# Patient Record
Sex: FEMALE | URBAN - METROPOLITAN AREA
[De-identification: names, ages, dates, MRNs, and addresses within clinical notes are randomized per-mention and may not be internally consistent; named-entity substitution may affect disease eponyms.]

---

## 2018-02-21 ENCOUNTER — INPATIENT (INPATIENT)
Facility: HOSPITAL | Age: 83
LOS: 7 days | Discharge: HOME | DRG: 326 | End: 2018-03-01
Attending: HOSPITALIST | Admitting: HOSPITALIST
Payer: MEDICARE

## 2018-02-21 VITALS
DIASTOLIC BLOOD PRESSURE: 73 MMHG | RESPIRATION RATE: 18 BRPM | TEMPERATURE: 98 F | HEART RATE: 78 BPM | WEIGHT: 160.06 LBS | OXYGEN SATURATION: 98 % | HEIGHT: 68 IN | SYSTOLIC BLOOD PRESSURE: 113 MMHG

## 2018-02-21 LAB
ALBUMIN SERPL ELPH-MCNC: 3.8 G/DL — SIGNIFICANT CHANGE UP (ref 3.3–5.2)
ALP SERPL-CCNC: 124 U/L — HIGH (ref 40–120)
ALT FLD-CCNC: 28 U/L — SIGNIFICANT CHANGE UP
ANION GAP SERPL CALC-SCNC: 13 MMOL/L — SIGNIFICANT CHANGE UP (ref 5–17)
APTT BLD: 30.6 SEC — SIGNIFICANT CHANGE UP (ref 27.5–37.4)
AST SERPL-CCNC: 22 U/L — SIGNIFICANT CHANGE UP
BASOPHILS # BLD AUTO: 0 K/UL — SIGNIFICANT CHANGE UP (ref 0–0.2)
BASOPHILS NFR BLD AUTO: 0.2 % — SIGNIFICANT CHANGE UP (ref 0–2)
BILIRUB SERPL-MCNC: 0.4 MG/DL — SIGNIFICANT CHANGE UP (ref 0.4–2)
BUN SERPL-MCNC: 31 MG/DL — HIGH (ref 8–20)
CALCIUM SERPL-MCNC: 10.8 MG/DL — HIGH (ref 8.6–10.2)
CHLORIDE SERPL-SCNC: 103 MMOL/L — SIGNIFICANT CHANGE UP (ref 98–107)
CO2 SERPL-SCNC: 25 MMOL/L — SIGNIFICANT CHANGE UP (ref 22–29)
CREAT SERPL-MCNC: 0.65 MG/DL — SIGNIFICANT CHANGE UP (ref 0.5–1.3)
EOSINOPHIL # BLD AUTO: 0 K/UL — SIGNIFICANT CHANGE UP (ref 0–0.5)
EOSINOPHIL NFR BLD AUTO: 0 % — SIGNIFICANT CHANGE UP (ref 0–6)
GLUCOSE SERPL-MCNC: 96 MG/DL — SIGNIFICANT CHANGE UP (ref 70–115)
HCT VFR BLD CALC: 35.9 % — LOW (ref 37–47)
HGB BLD-MCNC: 11.6 G/DL — LOW (ref 12–16)
INR BLD: 1.4 RATIO — HIGH (ref 0.88–1.16)
LIDOCAIN IGE QN: 24 U/L — SIGNIFICANT CHANGE UP (ref 22–51)
LYMPHOCYTES # BLD AUTO: 1.5 K/UL — SIGNIFICANT CHANGE UP (ref 1–4.8)
LYMPHOCYTES # BLD AUTO: 15.7 % — LOW (ref 20–55)
MAGNESIUM SERPL-MCNC: 2.4 MG/DL — SIGNIFICANT CHANGE UP (ref 1.6–2.6)
MCHC RBC-ENTMCNC: 29.3 PG — SIGNIFICANT CHANGE UP (ref 27–31)
MCHC RBC-ENTMCNC: 32.3 G/DL — SIGNIFICANT CHANGE UP (ref 32–36)
MCV RBC AUTO: 90.7 FL — SIGNIFICANT CHANGE UP (ref 81–99)
MONOCYTES # BLD AUTO: 0.5 K/UL — SIGNIFICANT CHANGE UP (ref 0–0.8)
MONOCYTES NFR BLD AUTO: 5.2 % — SIGNIFICANT CHANGE UP (ref 3–10)
NEUTROPHILS # BLD AUTO: 7.4 K/UL — SIGNIFICANT CHANGE UP (ref 1.8–8)
NEUTROPHILS NFR BLD AUTO: 78.7 % — HIGH (ref 37–73)
PLATELET # BLD AUTO: 318 K/UL — SIGNIFICANT CHANGE UP (ref 150–400)
POTASSIUM SERPL-MCNC: 4.6 MMOL/L — SIGNIFICANT CHANGE UP (ref 3.5–5.3)
POTASSIUM SERPL-SCNC: 4.6 MMOL/L — SIGNIFICANT CHANGE UP (ref 3.5–5.3)
PROT SERPL-MCNC: 7.3 G/DL — SIGNIFICANT CHANGE UP (ref 6.6–8.7)
PROTHROM AB SERPL-ACNC: 15.5 SEC — HIGH (ref 9.8–12.7)
RBC # BLD: 3.96 M/UL — LOW (ref 4.4–5.2)
RBC # FLD: 16.3 % — HIGH (ref 11–15.6)
SODIUM SERPL-SCNC: 141 MMOL/L — SIGNIFICANT CHANGE UP (ref 135–145)
TROPONIN T SERPL-MCNC: 0.01 NG/ML — SIGNIFICANT CHANGE UP (ref 0–0.06)
TSH SERPL-MCNC: 5.9 UIU/ML — HIGH (ref 0.27–4.2)
WBC # BLD: 9.4 K/UL — SIGNIFICANT CHANGE UP (ref 4.8–10.8)
WBC # FLD AUTO: 9.4 K/UL — SIGNIFICANT CHANGE UP (ref 4.8–10.8)

## 2018-02-21 PROCEDURE — 99291 CRITICAL CARE FIRST HOUR: CPT | Mod: 25

## 2018-02-21 PROCEDURE — 31500 INSERT EMERGENCY AIRWAY: CPT

## 2018-02-21 RX ORDER — SODIUM CHLORIDE 9 MG/ML
1000 INJECTION INTRAMUSCULAR; INTRAVENOUS; SUBCUTANEOUS
Qty: 0 | Refills: 0 | Status: DISCONTINUED | OUTPATIENT
Start: 2018-02-21 | End: 2018-02-22

## 2018-02-21 RX ORDER — MORPHINE SULFATE 50 MG/1
2 CAPSULE, EXTENDED RELEASE ORAL ONCE
Qty: 0 | Refills: 0 | Status: DISCONTINUED | OUTPATIENT
Start: 2018-02-21 | End: 2018-02-21

## 2018-02-21 RX ADMIN — SODIUM CHLORIDE 150 MILLILITER(S): 9 INJECTION INTRAMUSCULAR; INTRAVENOUS; SUBCUTANEOUS at 23:17

## 2018-02-21 NOTE — ED ADULT TRIAGE NOTE - CHIEF COMPLAINT QUOTE
pt alert and awake, normal baseline, sent by PMD for clogged g-tube, was at [facility] yesterday for same complaint.

## 2018-02-21 NOTE — ED PROVIDER NOTE - NS ED MD EM SELECTION
[id number] Comprehensive [id number] Critical Care - 30 to 74 minutes [id number] Comprehensive [id number] Critical Care - 30 to 74 minutes

## 2018-02-21 NOTE — ED PROVIDER NOTE - CRITICAL CARE PROVIDED
direct patient care (not related to procedure)/consultation with other physicians/interpretation of diagnostic studies/additional history taking/consult w/ pt's family directly relating to pts condition/documentation

## 2018-02-21 NOTE — ED PROVIDER NOTE - PHYSICAL EXAMINATION
Constitutional : Pt appears dazed, makes eye contact.   Head :NC AT , no swelling  Eyes :eomi, no swelling  Mouth : Dry mucous membranes.   Neck : supple, trachea in midline  Chest :Alex air entry, symm chest expansion, no distress  Heart :S1 S2 distant  Abdomen : Pt grimaces with palpation of LUQ and RUQ.   Musc/Skel : 3- / 5 strength to bilateral lower extremities. 4/5 strength to bilateral upper extremities  Neuro  : Pt oriented to person. Motor and sensory grossly intact. Constitutional : Pt appears dazed, makes eye contact.   Head :NC AT , no swelling  Eyes :eomi, no swelling  Mouth : Dry mucous membranes.   Neck : supple, trachea in midline  Chest :Alex air entry, symm chest expansion, no distress  Heart :S1 S2 distant  Abdomen : Pt grimaces with palpation of LUQ and RUQ. mid upper abd peg tube with granulating tissue, no surrounding swelling, no surrounding erythema,  Musc/Skel : 3- / 5 strength to bilateral lower extremities. 4/5 strength to bilateral upper extremities  Neuro  : Pt oriented to person. Motor and sensory grossly intact.

## 2018-02-21 NOTE — ED PROVIDER NOTE - MEDICAL DECISION MAKING DETAILS
An 88 y/o female pt with a hx of UTI's, aspiration, presents to the ED c/o G-tube clog. Since G-tube is 2 weeks old will CT a/p and re-evaluate.

## 2018-02-21 NOTE — ED ADULT NURSE NOTE - OBJECTIVE STATEMENT
Pt. presents to ED for PEG tube eval. Pt. is bedbound, had PEG tube placed two weeks prior due to frequent aspirations. Family is concerned PEG tube clogged.

## 2018-02-21 NOTE — ED PROVIDER NOTE - OBJECTIVE STATEMENT
An 87 year old female pt with recurrent aspirations, UTI's, bedbound and had PEG placed 2 weeks ago at [facility] by surgeon presents to the ED c/o PEG tube issue. Pt was seen at [facility] yesterday and as per daughter  the tube was flushed. Pt was sent home and since yesterday has not been able to receive feedings. She has not had any  fevers, vomiting or diarrhea. No further hx available at this time.

## 2018-02-21 NOTE — ED PROVIDER NOTE - PROGRESS NOTE DETAILS
Spoke with radiologist in regards to CT. Rim of PEG is at surface of the abd with no balloon noted. Spoke with radiologist in regards to CT. Rim of PEG is at surface of the abd with no balloon noted.  reexamined peg it does not have a balloon entrance separate, most likelt tube is curved on ct daughter left before ct was read, she expressed that she does not want mother to be transferred back to the [facility], she also states she does not want to take mother home

## 2018-02-22 DIAGNOSIS — K92.0 HEMATEMESIS: ICD-10-CM

## 2018-02-22 DIAGNOSIS — T85.598A OTHER MECHANICAL COMPLICATION OF OTHER GASTROINTESTINAL PROSTHETIC DEVICES, IMPLANTS AND GRAFTS, INITIAL ENCOUNTER: ICD-10-CM

## 2018-02-22 DIAGNOSIS — Z93.1 GASTROSTOMY STATUS: Chronic | ICD-10-CM

## 2018-02-22 DIAGNOSIS — Z98.890 OTHER SPECIFIED POSTPROCEDURAL STATES: Chronic | ICD-10-CM

## 2018-02-22 LAB
ALBUMIN SERPL ELPH-MCNC: 3.4 G/DL — SIGNIFICANT CHANGE UP (ref 3.3–5.2)
ALP SERPL-CCNC: 106 U/L — SIGNIFICANT CHANGE UP (ref 40–120)
ALT FLD-CCNC: 19 U/L — SIGNIFICANT CHANGE UP
ANION GAP SERPL CALC-SCNC: 14 MMOL/L — SIGNIFICANT CHANGE UP (ref 5–17)
ANION GAP SERPL CALC-SCNC: 14 MMOL/L — SIGNIFICANT CHANGE UP (ref 5–17)
ANISOCYTOSIS BLD QL: SLIGHT — SIGNIFICANT CHANGE UP
ANISOCYTOSIS BLD QL: SLIGHT — SIGNIFICANT CHANGE UP
AST SERPL-CCNC: 15 U/L — SIGNIFICANT CHANGE UP
BASOPHILS # BLD AUTO: 0 K/UL — SIGNIFICANT CHANGE UP (ref 0–0.2)
BASOPHILS NFR BLD AUTO: 0 % — SIGNIFICANT CHANGE UP (ref 0–2)
BASOPHILS NFR BLD AUTO: 0.1 % — SIGNIFICANT CHANGE UP (ref 0–2)
BASOPHILS NFR BLD AUTO: 0.2 % — SIGNIFICANT CHANGE UP (ref 0–2)
BILIRUB SERPL-MCNC: 0.3 MG/DL — LOW (ref 0.4–2)
BLD GP AB SCN SERPL QL: SIGNIFICANT CHANGE UP
BUN SERPL-MCNC: 30 MG/DL — HIGH (ref 8–20)
BUN SERPL-MCNC: 35 MG/DL — HIGH (ref 8–20)
CALCIUM SERPL-MCNC: 10.2 MG/DL — SIGNIFICANT CHANGE UP (ref 8.6–10.2)
CALCIUM SERPL-MCNC: 9.4 MG/DL — SIGNIFICANT CHANGE UP (ref 8.6–10.2)
CHLORIDE SERPL-SCNC: 101 MMOL/L — SIGNIFICANT CHANGE UP (ref 98–107)
CHLORIDE SERPL-SCNC: 105 MMOL/L — SIGNIFICANT CHANGE UP (ref 98–107)
CO2 SERPL-SCNC: 21 MMOL/L — LOW (ref 22–29)
CO2 SERPL-SCNC: 24 MMOL/L — SIGNIFICANT CHANGE UP (ref 22–29)
CREAT SERPL-MCNC: 0.54 MG/DL — SIGNIFICANT CHANGE UP (ref 0.5–1.3)
CREAT SERPL-MCNC: 0.82 MG/DL — SIGNIFICANT CHANGE UP (ref 0.5–1.3)
EOSINOPHIL # BLD AUTO: 0 K/UL — SIGNIFICANT CHANGE UP (ref 0–0.5)
EOSINOPHIL NFR BLD AUTO: 0 % — SIGNIFICANT CHANGE UP (ref 0–6)
GAS PNL BLDA: SIGNIFICANT CHANGE UP
GAS PNL BLDA: SIGNIFICANT CHANGE UP
GLUCOSE SERPL-MCNC: 116 MG/DL — HIGH (ref 70–115)
GLUCOSE SERPL-MCNC: 291 MG/DL — HIGH (ref 70–115)
HCT VFR BLD CALC: 28.8 % — LOW (ref 37–47)
HCT VFR BLD CALC: 30.5 % — LOW (ref 37–47)
HCT VFR BLD CALC: 35.4 % — LOW (ref 37–47)
HGB BLD-MCNC: 11.4 G/DL — LOW (ref 12–16)
HGB BLD-MCNC: 8.9 G/DL — LOW (ref 12–16)
HGB BLD-MCNC: 9.5 G/DL — LOW (ref 12–16)
HYPOCHROMIA BLD QL: SLIGHT — SIGNIFICANT CHANGE UP
LACTATE BLDV-MCNC: 6.6 MMOL/L — CRITICAL HIGH (ref 0.5–2)
LYMPHOCYTES # BLD AUTO: 1.1 K/UL — SIGNIFICANT CHANGE UP (ref 1–4.8)
LYMPHOCYTES # BLD AUTO: 10 % — LOW (ref 20–55)
LYMPHOCYTES # BLD AUTO: 14.5 % — LOW (ref 20–55)
LYMPHOCYTES # BLD AUTO: 14.6 % — LOW (ref 20–55)
LYMPHOCYTES # BLD AUTO: 3.4 K/UL — SIGNIFICANT CHANGE UP (ref 1–4.8)
LYMPHOCYTES # BLD AUTO: 4 K/UL — SIGNIFICANT CHANGE UP (ref 1–4.8)
MACROCYTES BLD QL: SLIGHT — SIGNIFICANT CHANGE UP
MACROCYTES BLD QL: SLIGHT — SIGNIFICANT CHANGE UP
MAGNESIUM SERPL-MCNC: 2.3 MG/DL — SIGNIFICANT CHANGE UP (ref 1.6–2.6)
MCHC RBC-ENTMCNC: 28.6 PG — SIGNIFICANT CHANGE UP (ref 27–31)
MCHC RBC-ENTMCNC: 28.9 PG — SIGNIFICANT CHANGE UP (ref 27–31)
MCHC RBC-ENTMCNC: 29 PG — SIGNIFICANT CHANGE UP (ref 27–31)
MCHC RBC-ENTMCNC: 30.9 G/DL — LOW (ref 32–36)
MCHC RBC-ENTMCNC: 31.1 G/DL — LOW (ref 32–36)
MCHC RBC-ENTMCNC: 32.2 G/DL — SIGNIFICANT CHANGE UP (ref 32–36)
MCV RBC AUTO: 89.8 FL — SIGNIFICANT CHANGE UP (ref 81–99)
MCV RBC AUTO: 91.9 FL — SIGNIFICANT CHANGE UP (ref 81–99)
MCV RBC AUTO: 93.8 FL — SIGNIFICANT CHANGE UP (ref 81–99)
MICROCYTES BLD QL: SLIGHT — SIGNIFICANT CHANGE UP
MICROCYTES BLD QL: SLIGHT — SIGNIFICANT CHANGE UP
MONOCYTES # BLD AUTO: 0.6 K/UL — SIGNIFICANT CHANGE UP (ref 0–0.8)
MONOCYTES # BLD AUTO: 1.5 K/UL — HIGH (ref 0–0.8)
MONOCYTES # BLD AUTO: 1.8 K/UL — HIGH (ref 0–0.8)
MONOCYTES NFR BLD AUTO: 4 % — SIGNIFICANT CHANGE UP (ref 3–10)
MONOCYTES NFR BLD AUTO: 7.3 % — SIGNIFICANT CHANGE UP (ref 3–10)
MONOCYTES NFR BLD AUTO: 7.7 % — SIGNIFICANT CHANGE UP (ref 3–10)
NEUTROPHILS # BLD AUTO: 18.1 K/UL — HIGH (ref 1.8–8)
NEUTROPHILS # BLD AUTO: 19.4 K/UL — HIGH (ref 1.8–8)
NEUTROPHILS # BLD AUTO: 6.1 K/UL — SIGNIFICANT CHANGE UP (ref 1.8–8)
NEUTROPHILS NFR BLD AUTO: 77 % — HIGH (ref 37–73)
NEUTROPHILS NFR BLD AUTO: 78 % — HIGH (ref 37–73)
NEUTROPHILS NFR BLD AUTO: 84 % — HIGH (ref 37–73)
NEUTS BAND # BLD: 1 % — SIGNIFICANT CHANGE UP (ref 0–8)
OVALOCYTES BLD QL SMEAR: SLIGHT — SIGNIFICANT CHANGE UP
OVALOCYTES BLD QL SMEAR: SLIGHT — SIGNIFICANT CHANGE UP
PLAT MORPH BLD: NORMAL — SIGNIFICANT CHANGE UP
PLAT MORPH BLD: NORMAL — SIGNIFICANT CHANGE UP
PLATELET # BLD AUTO: 270 K/UL — SIGNIFICANT CHANGE UP (ref 150–400)
PLATELET # BLD AUTO: 415 K/UL — HIGH (ref 150–400)
PLATELET # BLD AUTO: 422 K/UL — HIGH (ref 150–400)
PLATELET CLUMP BLD QL SMEAR: SIGNIFICANT CHANGE UP
PLATELET COUNT - ESTIMATE: NORMAL — SIGNIFICANT CHANGE UP
POIKILOCYTOSIS BLD QL AUTO: SLIGHT — SIGNIFICANT CHANGE UP
POIKILOCYTOSIS BLD QL AUTO: SLIGHT — SIGNIFICANT CHANGE UP
POTASSIUM SERPL-MCNC: 4.3 MMOL/L — SIGNIFICANT CHANGE UP (ref 3.5–5.3)
POTASSIUM SERPL-MCNC: 7.4 MMOL/L — CRITICAL HIGH (ref 3.5–5.3)
POTASSIUM SERPL-SCNC: 4.3 MMOL/L — SIGNIFICANT CHANGE UP (ref 3.5–5.3)
POTASSIUM SERPL-SCNC: 7.4 MMOL/L — CRITICAL HIGH (ref 3.5–5.3)
PROT SERPL-MCNC: 6.1 G/DL — LOW (ref 6.6–8.7)
RBC # BLD: 3.07 M/UL — LOW (ref 4.4–5.2)
RBC # BLD: 3.32 M/UL — LOW (ref 4.4–5.2)
RBC # BLD: 3.94 M/UL — LOW (ref 4.4–5.2)
RBC # FLD: 16.2 % — HIGH (ref 11–15.6)
RBC # FLD: 16.4 % — HIGH (ref 11–15.6)
RBC # FLD: 16.6 % — HIGH (ref 11–15.6)
RBC BLD AUTO: ABNORMAL
RBC BLD AUTO: ABNORMAL
SODIUM SERPL-SCNC: 136 MMOL/L — SIGNIFICANT CHANGE UP (ref 135–145)
SODIUM SERPL-SCNC: 143 MMOL/L — SIGNIFICANT CHANGE UP (ref 135–145)
TROPONIN T SERPL-MCNC: <0.01 NG/ML — SIGNIFICANT CHANGE UP (ref 0–0.06)
TYPE + AB SCN PNL BLD: SIGNIFICANT CHANGE UP
VARIANT LYMPHS # BLD: 1 % — SIGNIFICANT CHANGE UP (ref 0–6)
WBC # BLD: 23.6 K/UL — HIGH (ref 4.8–10.8)
WBC # BLD: 25.2 K/UL — HIGH (ref 4.8–10.8)
WBC # BLD: 7.8 K/UL — SIGNIFICANT CHANGE UP (ref 4.8–10.8)
WBC # FLD AUTO: 23.6 K/UL — HIGH (ref 4.8–10.8)
WBC # FLD AUTO: 25.2 K/UL — HIGH (ref 4.8–10.8)
WBC # FLD AUTO: 7.8 K/UL — SIGNIFICANT CHANGE UP (ref 4.8–10.8)

## 2018-02-22 PROCEDURE — 99223 1ST HOSP IP/OBS HIGH 75: CPT | Mod: 25

## 2018-02-22 PROCEDURE — 71045 X-RAY EXAM CHEST 1 VIEW: CPT | Mod: 26,77

## 2018-02-22 PROCEDURE — 43255 EGD CONTROL BLEEDING ANY: CPT

## 2018-02-22 PROCEDURE — 71045 X-RAY EXAM CHEST 1 VIEW: CPT | Mod: 26

## 2018-02-22 PROCEDURE — 49440 PLACE GASTROSTOMY TUBE PERC: CPT

## 2018-02-22 PROCEDURE — 93010 ELECTROCARDIOGRAM REPORT: CPT

## 2018-02-22 PROCEDURE — 12345: CPT | Mod: GC,NC

## 2018-02-22 RX ORDER — PANTOPRAZOLE SODIUM 20 MG/1
8 TABLET, DELAYED RELEASE ORAL
Qty: 80 | Refills: 0 | Status: DISCONTINUED | OUTPATIENT
Start: 2018-02-22 | End: 2018-02-24

## 2018-02-22 RX ORDER — CHLORHEXIDINE GLUCONATE 213 G/1000ML
15 SOLUTION TOPICAL
Qty: 0 | Refills: 0 | Status: DISCONTINUED | OUTPATIENT
Start: 2018-02-22 | End: 2018-02-24

## 2018-02-22 RX ORDER — METOPROLOL TARTRATE 50 MG
5 TABLET ORAL EVERY 8 HOURS
Qty: 0 | Refills: 0 | Status: DISCONTINUED | OUTPATIENT
Start: 2018-02-22 | End: 2018-02-22

## 2018-02-22 RX ORDER — DEXMEDETOMIDINE HYDROCHLORIDE IN 0.9% SODIUM CHLORIDE 4 UG/ML
0.2 INJECTION INTRAVENOUS
Qty: 200 | Refills: 0 | Status: DISCONTINUED | OUTPATIENT
Start: 2018-02-22 | End: 2018-02-23

## 2018-02-22 RX ORDER — LEVOTHYROXINE SODIUM 125 MCG
56 TABLET ORAL AT BEDTIME
Qty: 0 | Refills: 0 | Status: DISCONTINUED | OUTPATIENT
Start: 2018-02-22 | End: 2018-02-24

## 2018-02-22 RX ORDER — SODIUM BICARBONATE 1 MEQ/ML
50 SYRINGE (ML) INTRAVENOUS ONCE
Qty: 0 | Refills: 0 | Status: COMPLETED | OUTPATIENT
Start: 2018-02-22 | End: 2018-02-22

## 2018-02-22 RX ORDER — METOCLOPRAMIDE HCL 10 MG
10 TABLET ORAL ONCE
Qty: 0 | Refills: 0 | Status: COMPLETED | OUTPATIENT
Start: 2018-02-22 | End: 2018-02-22

## 2018-02-22 RX ORDER — INSULIN HUMAN 100 [IU]/ML
10 INJECTION, SOLUTION SUBCUTANEOUS ONCE
Qty: 0 | Refills: 0 | Status: COMPLETED | OUTPATIENT
Start: 2018-02-22 | End: 2018-02-22

## 2018-02-22 RX ORDER — FENTANYL CITRATE 50 UG/ML
75 INJECTION INTRAVENOUS
Qty: 0 | Refills: 0 | Status: DISCONTINUED | OUTPATIENT
Start: 2018-02-22 | End: 2018-02-22

## 2018-02-22 RX ORDER — PANTOPRAZOLE SODIUM 20 MG/1
80 TABLET, DELAYED RELEASE ORAL ONCE
Qty: 0 | Refills: 0 | Status: COMPLETED | OUTPATIENT
Start: 2018-02-22 | End: 2018-02-22

## 2018-02-22 RX ORDER — CALCIUM GLUCONATE 100 MG/ML
2 VIAL (ML) INTRAVENOUS ONCE
Qty: 0 | Refills: 0 | Status: COMPLETED | OUTPATIENT
Start: 2018-02-22 | End: 2018-02-22

## 2018-02-22 RX ORDER — DEXTROSE 50 % IN WATER 50 %
50 SYRINGE (ML) INTRAVENOUS ONCE
Qty: 0 | Refills: 0 | Status: COMPLETED | OUTPATIENT
Start: 2018-02-22 | End: 2018-02-22

## 2018-02-22 RX ORDER — ACETAMINOPHEN 500 MG
650 TABLET ORAL EVERY 6 HOURS
Qty: 0 | Refills: 0 | Status: DISCONTINUED | OUTPATIENT
Start: 2018-02-22 | End: 2018-02-22

## 2018-02-22 RX ORDER — FENTANYL CITRATE 50 UG/ML
75 INJECTION INTRAVENOUS
Qty: 0 | Refills: 0 | Status: DISCONTINUED | OUTPATIENT
Start: 2018-02-22 | End: 2018-02-23

## 2018-02-22 RX ORDER — PANTOPRAZOLE SODIUM 20 MG/1
40 TABLET, DELAYED RELEASE ORAL
Qty: 0 | Refills: 0 | Status: DISCONTINUED | OUTPATIENT
Start: 2018-02-22 | End: 2018-02-22

## 2018-02-22 RX ORDER — NOREPINEPHRINE BITARTRATE/D5W 8 MG/250ML
1 PLASTIC BAG, INJECTION (ML) INTRAVENOUS
Qty: 8 | Refills: 0 | Status: DISCONTINUED | OUTPATIENT
Start: 2018-02-22 | End: 2018-02-23

## 2018-02-22 RX ORDER — PROPOFOL 10 MG/ML
10 INJECTION, EMULSION INTRAVENOUS
Qty: 1000 | Refills: 0 | Status: DISCONTINUED | OUTPATIENT
Start: 2018-02-22 | End: 2018-02-23

## 2018-02-22 RX ORDER — PROTHROMBIN COMPLEX CONCENTRATE (HUMAN) 25.5; 16.5; 24; 22; 22; 26 [IU]/ML; [IU]/ML; [IU]/ML; [IU]/ML; [IU]/ML; [IU]/ML
2000 POWDER, FOR SOLUTION INTRAVENOUS ONCE
Qty: 0 | Refills: 0 | Status: COMPLETED | OUTPATIENT
Start: 2018-02-22 | End: 2018-02-22

## 2018-02-22 RX ORDER — PROTHROMBIN COMPLEX CONCENTRATE (HUMAN) 25.5; 16.5; 24; 22; 22; 26 [IU]/ML; [IU]/ML; [IU]/ML; [IU]/ML; [IU]/ML; [IU]/ML
3000 POWDER, FOR SOLUTION INTRAVENOUS ONCE
Qty: 0 | Refills: 0 | Status: DISCONTINUED | OUTPATIENT
Start: 2018-02-22 | End: 2018-02-22

## 2018-02-22 RX ORDER — FENTANYL CITRATE 50 UG/ML
100 INJECTION INTRAVENOUS ONCE
Qty: 0 | Refills: 0 | Status: DISCONTINUED | OUTPATIENT
Start: 2018-02-22 | End: 2018-02-22

## 2018-02-22 RX ADMIN — PROPOFOL 4.36 MICROGRAM(S)/KG/MIN: 10 INJECTION, EMULSION INTRAVENOUS at 23:29

## 2018-02-22 RX ADMIN — Medication 56 MICROGRAM(S): at 23:29

## 2018-02-22 RX ADMIN — Medication 136.12 MICROGRAM(S)/KG/MIN: at 21:56

## 2018-02-22 RX ADMIN — PROPOFOL 4.36 MICROGRAM(S)/KG/MIN: 10 INJECTION, EMULSION INTRAVENOUS at 21:00

## 2018-02-22 RX ADMIN — Medication 50 MILLILITER(S): at 20:24

## 2018-02-22 RX ADMIN — INSULIN HUMAN 10 UNIT(S): 100 INJECTION, SOLUTION SUBCUTANEOUS at 20:26

## 2018-02-22 RX ADMIN — PANTOPRAZOLE SODIUM 80 MILLIGRAM(S): 20 TABLET, DELAYED RELEASE ORAL at 20:23

## 2018-02-22 RX ADMIN — Medication 10 MILLIGRAM(S): at 21:25

## 2018-02-22 RX ADMIN — Medication 400 GRAM(S): at 20:37

## 2018-02-22 RX ADMIN — Medication 5 MILLIGRAM(S): at 06:30

## 2018-02-22 RX ADMIN — FENTANYL CITRATE 100 MICROGRAM(S): 50 INJECTION INTRAVENOUS at 20:23

## 2018-02-22 RX ADMIN — PANTOPRAZOLE SODIUM 10 MG/HR: 20 TABLET, DELAYED RELEASE ORAL at 21:33

## 2018-02-22 RX ADMIN — SODIUM CHLORIDE 125 MILLILITER(S): 9 INJECTION INTRAMUSCULAR; INTRAVENOUS; SUBCUTANEOUS at 18:25

## 2018-02-22 RX ADMIN — Medication 50 MILLIEQUIVALENT(S): at 20:25

## 2018-02-22 RX ADMIN — PROTHROMBIN COMPLEX CONCENTRATE (HUMAN) 400 INTERNATIONAL UNIT(S): 25.5; 16.5; 24; 22; 22; 26 POWDER, FOR SOLUTION INTRAVENOUS at 21:02

## 2018-02-22 RX ADMIN — Medication 5 MILLIGRAM(S): at 15:19

## 2018-02-22 RX ADMIN — DEXMEDETOMIDINE HYDROCHLORIDE IN 0.9% SODIUM CHLORIDE 3.63 MICROGRAM(S)/KG/HR: 4 INJECTION INTRAVENOUS at 19:59

## 2018-02-22 NOTE — CHART NOTE - NSCHARTNOTEFT_GEN_A_CORE
Rapid Response PGY 2/ PGY 3 Note    [id number]  MARQUIS JACOBS    Rapid Response was called on a 87y year old Female patient for  acute respiratory distress and brisk bleeding from the NG.  Patient was intubated in the ED on my arrival and being stabilized.    Patient recently received a PEG tube at [facility] 2nd to dysphagia and aspiration, which became dislodged and clogged.  There was a replacement of the gastrostomy tube under moderate sedation today by IR Dr. Clayton.    Patient was seen and examined at the bedside by the rapid response team.    Allergies    No Known Allergies    Intolerances    vitals /107  RR43   O2 sat 88      PAST MEDICAL & SURGICAL HISTORY:  Compression fracture of thoracic vertebra: T12  Hypothyroidism  HLD (hyperlipidemia)  HTN (hypertension)  Dementia  History of hip surgery: B/L  S/P percutaneous endoscopic gastrostomy (PEG) tube placement      Vital Signs Last 24 Hrs  T(C): 36.8 (22 Feb 2018 17:38), Max: 36.9 (21 Feb 2018 23:13)  T(F): 98.3 (22 Feb 2018 17:38), Max: 98.5 (21 Feb 2018 23:13)  HR: 114 (22 Feb 2018 20:00) (74 - 123)  BP: 75/47 (22 Feb 2018 20:00) (75/47 - 143/67)  BP(mean): --  RR: 30 (22 Feb 2018 19:50) (17 - 30)  SpO2: 100% (22 Feb 2018 20:00) (94% - 100%)          PHYSICAL EXAM:    GENERAL: intubated  HEAD:  Atraumatic, Normocephalic  ENMT: intubated, NG tube placed with 400 cc of bright red blood drained  NECK: Supple, No JVD, Normal thyroid  NERVOUS SYSTEM:  sedated  CHEST/LUNG: Clear to percussion bilaterally;  HEART: Tachycardia; No murmurs, rubs, or gallops  ABDOMEN: Soft, Nontender, Nondistended  EXTREMITIES:  2+ Peripheral Pulses, No clubbing, cyanosis, or edema  SKIN: No rashes or lesions      02-21 @ 07:01  -  02-22 @ 07:00  --------------------------------------------------------  IN: 125 mL / OUT: 0 mL / NET: 125 mL    02-22 @ 07:01  -  02-22 @ 20:17  --------------------------------------------------------  IN: 1125 mL / OUT: 0 mL / NET: 1125 mL                              9.5    25.2  )-----------( 422      ( 22 Feb 2018 19:22 )             30.5     02-22    136  |  101  |  35.0<H>  ----------------------------<  291<H>  7.4<HH>   |  21.0<L>  |  0.82    Ca    9.4      22 Feb 2018 19:22  Mg     2.3     02-22    TPro  6.1<L>  /  Alb  3.4  /  TBili  0.3<L>  /  DBili  x   /  AST  15  /  ALT  19  /  AlkPhos  106  02-22         LIVER FUNCTIONS - ( 22 Feb 2018 19:22 )  Alb: 3.4 g/dL / Pro: 6.1 g/dL / ALK PHOS: 106 U/L / ALT: 19 U/L / AST: 15 U/L / GGT: x              PT/INR - ( 21 Feb 2018 21:17 )   PT: 15.5 sec;   INR: 1.40 ratio         PTT - ( 21 Feb 2018 21:17 )  PTT:30.6 sec    Vital Signs Last 24 Hrs*       Assessment- Rapid Response called for 87y year old Female with acute respiratory distress and brisk bleeding from the NG.    Plan-CBC, CMP, Type and Screen  2 units of blood stat, consent for blood transfusion obtained from daughter  protonix 80 mg IV given stat    CTA of abdomen and pelvis with contrast.    Hyperkalemia of 7.4   calcium gluconate 2 grams given stat    IR and Nocturnist Dr. Warner on call contacted, MICU, Acute Care Surgery, and Dr. Hays at the bedside.        [facility] PGY 3

## 2018-02-22 NOTE — ED PROCEDURE NOTE - PROCEDURE ADDITIONAL DETAILS
Patient was intubated with DL with 7.5 cm ETT, vocal cords visualized with Grade I view. ETT was passed on first attempt with positive color change no colorimeter, positive condensation on tube with breaths, bilateral breath sounds with absence of sounds over the stomach and oxygenation improved to 99%. Patient tolerated procedure well.

## 2018-02-22 NOTE — PROCEDURE NOTE - NSINFORMCONSENT_GEN_A_CORE
Benefits, risks, and possible complications of procedure explained to patient/caregiver who verbalized understanding and gave verbal consent./Daughter at bedside
Benefits, risks, and possible complications of procedure explained to patient/caregiver who verbalized understanding and gave verbal consent.

## 2018-02-22 NOTE — H&P ADULT - NSHPPHYSICALEXAM_GEN_ALL_CORE
Vital Signs   T(C): 36.9 (21 Feb 2018 23:13), Max: 36.9 (21 Feb 2018 23:13)  T(F): 98.5 (21 Feb 2018 23:13), Max: 98.5 (21 Feb 2018 23:13)  HR: 85 (21 Feb 2018 23:13) (78 - 85)  BP: 115/73 (21 Feb 2018 23:13) (113/73 - 115/73)  RR: 18 (21 Feb 2018 23:13) (18 - 18)  SpO2: 95% (21 Feb 2018 23:13) (95% - 98%)  General: Well developed. Well nourished. No acute distress  HEENT: PERRLA. EOMI. Clear conjunctivae. Dry mucus membrane  Neck: Supple. No JVD. No Thyromegaly   Chest: CTA bilaterally - no wheezing, rales or rhonchi.   Heart: Normal S1 & S2 with RRR.   Abdomen: Soft. Non-tender. Non-distended. + BS  Ext: No pedal edema. No calf tenderness. PEG in place with surrounding area of healing tissue.  Neuro: Active and alert. No focal deficit. Speech clear. Moves all four limbs.  Skin: Warm and Dry  Psychiatry: Normal mood and affect

## 2018-02-22 NOTE — PROGRESS NOTE ADULT - SUBJECTIVE AND OBJECTIVE BOX
CC: PEG tube is clogged (22 Feb 2018 00:47)    HPI:History was taken from charts and ED Physician as no family member is available at this time. Called patient's daughter - Monse Newsome but couldn't get in touch. Patient has dementia and is unable to provide any information.   87 years old female brought by EMS with clogged PEG Tube. Patient had PEG placed 2 weeks ago at [facility] due to dysphagia and recurrent aspiration. It was not working since yesterday and patient was taken to [facility] where it was flushed and later patient was discharged home. But it got clogged again so patient was brought to [facility]. Family refused to take her back to [facility].  No fever, abdominal pain, nausea or vomiting. (22 Feb 2018 00:47)    INTERVAL HPI/OVERNIGHT EVENTS: no complaints, confused, ROS UTO due to dementia    Vital Signs Last 24 Hrs  T(C): 36.4 (22 Feb 2018 12:02), Max: 36.9 (21 Feb 2018 23:13)  T(F): 97.6 (22 Feb 2018 12:02), Max: 98.5 (21 Feb 2018 23:13)  HR: 74 (22 Feb 2018 12:02) (74 - 85)  BP: 143/67 (22 Feb 2018 12:02) (113/73 - 143/67)  RR: 21 (22 Feb 2018 12:02) (17 - 21)  SpO2: 94% (22 Feb 2018 12:02) (94% - 98%)  I&O's Detail    21 Feb 2018 07:01  -  22 Feb 2018 07:00  --------------------------------------------------------  IN:    sodium chloride 0.9%.: 125 mL  Total IN: 125 mL    OUT:  Total OUT: 0 mL    Total NET: 125 mL      22 Feb 2018 07:01  -  22 Feb 2018 12:19  --------------------------------------------------------  IN:    sodium chloride 0.9%.: 625 mL  Total IN: 625 mL    OUT:  Total OUT: 0 mL    Total NET: 625 mL    CARDIAC MARKERS ( 21 Feb 2018 21:17 )  x     / 0.01 ng/mL / x     / x     / x                            11.4   7.8   )-----------( 270      ( 22 Feb 2018 06:14 )             35.4     22 Feb 2018 06:14    143    |  105    |  30.0   ----------------------------<  116    4.3     |  24.0   |  0.54     Ca    10.2       22 Feb 2018 06:14  Mg     2.3       22 Feb 2018 06:14    TPro  7.3    /  Alb  3.8    /  TBili  0.4    /  DBili  x      /  AST  22     /  ALT  28     /  AlkPhos  124    21 Feb 2018 21:17    PT/INR - ( 21 Feb 2018 21:17 )   PT: 15.5 sec;   INR: 1.40 ratio    PTT - ( 21 Feb 2018 21:17 )  PTT:30.6 sec  LIVER FUNCTIONS - ( 21 Feb 2018 21:17 )  Alb: 3.8 g/dL / Pro: 7.3 g/dL / ALK PHOS: 124 U/L / ALT: 28 U/L / AST: 22 U/L / GGT: x           MEDICATIONS  (STANDING):  levothyroxine Injectable 56 MICROGram(s) IV Push at bedtime  metoprolol    tartrate Injectable 5 milliGRAM(s) IV Push every 8 hours  sodium chloride 0.9%. 1000 milliLiter(s) (125 mL/Hr) IV Continuous <Continuous>    MEDICATIONS  (PRN):  acetaminophen  Suppository. 650 milliGRAM(s) Rectal every 6 hours PRN Headache or Bodyache    RADIOLOGY & ADDITIONAL TESTS: personally visualized    PHYSICAL EXAM:    General: elderly female in no acute distress  Eyes: PERRLA, EOMI; conjunctiva and sclera clear  Head: Normocephalic; atraumatic  ENMT: No nasal discharge; airway clear  Neck: Supple; non tender; no masses  Respiratory: No wheezes, rales or rhonchi  Cardiovascular: Regular rate and rhythm. S1 and S2  Gastrointestinal: Soft non-tender non-distended; Normal bowel sounds, PEG tube in subcutaneous tissue  Genitourinary: No costovertebral angle tenderness  Extremities: Normal range of motion, No clubbing, cyanosis or edema  Vascular: Peripheral pulses palpable 2+ bilaterally  Neurological: Alert, confused  Skin: Warm and dry.  Musculoskeletal: Normal tone, without deformities  Psychiatric: Cooperative

## 2018-02-22 NOTE — BRIEF OPERATIVE NOTE - OPERATION/FINDINGS
Fluoroscopy guided 20 fr gastrostomy tube placement.
Active arterial bleeding at gastrostomy tube insertion site in proximal body of stomach beneath the gastrostomy balloon, 5 cc. of epinephrine injected to slow the bleeding and then two endoscopic hemoclips were successfully deployed at arterial bleeding site with cessation of bleeding. Esophagus and remainder of stomach were normal. Clot in antrum with no bleeding which was small and could not be dislodged, Duodenum entered and bulb and second portion of duodenum were normal. No active bleeding post procedure.

## 2018-02-22 NOTE — ED ADULT NURSE REASSESSMENT NOTE - NS ED NURSE REASSESS COMMENT FT1
3rd unit of PRBC infusing as per icu liz liang.
alea Garcia, no NG tube to be replaced at this time
patient moved to critical care room, Dr. Garcia and team at bedside for upper endo of patient, patient placed on cardiac monitor, propafol stopped as per team, protonix drip started. RNs at bedside will continue to monitor patient
patients bloodpressure 60/30, levophed increased to 1mcg/kg as per ICU PA azul at 2149, repeat blood pressure at 2150 as charted, levo decreased to 0.5mcg/kg. at 2152, levophed decreased to 0.1mcg/kg as per GI team
1 unit of blood infusing via rapid transfuser
As per ICU PA, precedex increased to 1mcg/kg,
Dr. Garcia at bedside for evaluation
ICU PA at bedside for patient evaluation
ICU PA at bedside inserting central line
Levophed decreased to 0.1mcg/kg as per ICU PA
Precedex d/c'd while propofol infusing as per ICU CLARIBEL Yang
Surgery resident at patients bedside

## 2018-02-22 NOTE — CHART NOTE - NSCHARTNOTEFT_GEN_A_CORE
Pt was seen and examined at bedside along with surgery attending. pt at the time intubated; hypotensive SBP to 70's-80's; tachycardic to 110's; NGT in place with approximately 300 cc bloody output; PEG site with mild amount of blood. abdominal exam otherwise with mild diffuse ttp, soft; non-distended.    Recs  - massive transfusion protocol  - Kcentra  - GI for EGD  - surgery will follow closely  - full consult note to follow    Pt was seen and examined along with Dr. Ibrahim

## 2018-02-22 NOTE — H&P ADULT - NSHPLABSRESULTS_GEN_ALL_CORE
LABS:                        11.6   9.4   )-----------( 318      ( 21 Feb 2018 21:17 )             35.9     02-21    141  |  103  |  31.0<H>  ----------------------------<  96  4.6   |  25.0  |  0.65    Ca    10.8<H>      21 Feb 2018 21:17  Mg     2.4     02-21    TPro  7.3  /  Alb  3.8  /  TBili  0.4  /  DBili  x   /  AST  22  /  ALT  28  /  AlkPhos  124<H>  02-21    PT/INR - ( 21 Feb 2018 21:17 )   PT: 15.5 sec;   INR: 1.40 ratio         PTT - ( 21 Feb 2018 21:17 )  PTT:30.6 sec  CARDIAC MARKERS ( 21 Feb 2018 21:17 )  x     / 0.01 ng/mL / x     / x     / x        CT Abdomen and Pelvis w/ Oral Cont and w/ IV Cont (02.21.18 @ 22:23)  Malpositioned PEG tube with the balloon within the anterior subcutaneous tissues. Despite this, oral contrast is seen throughout the GI system   without extravasation likely via a tract into the stomach.  Age-indeterminate T12 compression fracture

## 2018-02-22 NOTE — BRIEF OPERATIVE NOTE - PRE-OP DX
Feeding tube dysfunction  02/22/2018    Active  Alfreda Guillory
Feeding tube dysfunction  02/22/2018    Active  Alfreda Guillory  Hematemesis, presence of nausea not specified  02/22/2018    Active  Mack Garcia

## 2018-02-22 NOTE — H&P ADULT - ASSESSMENT
History was taken from charts and ED Physician as no family member is available at this time. Called patient's daughter - Monse Newsome but couldn't get in touch. Patient has dementia and is unable to provide any information.   87 years old female brought by EMS with clogged PEG Tube. Patient had PEG placed 2 weeks ago at [facility] due to dysphagia and recurrent aspiration. It was not working since yesterday and patient was taken to [facility] where it was flushed and later patient was discharged home. But it got clogged again so patient was brought to [facility]. Family refused to take her to [facility].  No fever, abdominal pain, nausea or vomiting.     1) Malpositioned PEG Tube  - IR consult in am to reposition the tube  - NPO  2) Dehydration  - IVF  3) HTN  - Will verify meds from family in am  - Metoprolol 5 mg IVP q 8 hours  4) HLD  - Will verify meds from family in am  5) Hypothyroidism  - Levothyroxine 56 mcg IVP (as per meds in system Levothyroxine 112 mcg)  6) Dementia  - Will verify meds from family in am  DVT Prophylaxis -- As per meds in system, patient is taking Eliquis 2.5 mg at home. Will verify from family and will restart. History was taken from charts and ED Physician as no family member is available at this time. Called patient's daughter - Monse Newsome but couldn't get in touch. Patient has dementia and is unable to provide any information.   87 years old female brought by EMS with clogged PEG Tube. Patient had PEG placed 2 weeks ago at [facility] due to dysphagia and recurrent aspiration. It was not working since yesterday and patient was taken to [facility] where it was flushed and later patient was discharged home. But it got clogged again so patient was brought to [facility]. Family refused to take her back to [facility].  No fever, abdominal pain, nausea or vomiting.     1) Malpositioned PEG Tube  - IR consult in am to reposition the tube  - NPO  2) Dehydration  - IVF  3) HTN  - Will verify meds from family in am  - Metoprolol 5 mg IVP q 8 hours  4) HLD  - Will verify meds from family in am  5) Hypothyroidism  - Levothyroxine 56 mcg IVP (as per meds in system Levothyroxine 112 mcg)  6) Dementia  - Will verify meds from family in am  DVT Prophylaxis -- As per meds in system, patient is taking Eliquis 2.5 mg at home. Will verify from family and will restart. History was taken from charts and ED Physician as no family member is available at this time. Called patient's daughter - Monse Newsome but couldn't get in touch. Patient has dementia and is unable to provide any information.   87 years old female brought by EMS with clogged PEG Tube. Patient had PEG placed 2 weeks ago at [facility] due to dysphagia and recurrent aspiration. It was not working since yesterday and patient was taken to [facility] where it was flushed and later patient was discharged home. But it got clogged again so patient was brought to [facility]. Family refused to take her back to [facility].  No fever, abdominal pain, nausea or vomiting.     1) Malpositioned PEG Tube  - IR consult in am to reposition the tube  - NPO  2) Dehydration  - IVF  3) HTN  - Will verify meds from family in am  - Metoprolol 5 mg IVP q 8 hours  4) HLD  - Will verify meds from family in am  5) Hypothyroidism  - Levothyroxine 56 mcg IVP (as per meds in system Levothyroxine 112 mcg)  6) Dementia  - Will verify meds from family in am  DVT Prophylaxis -- As per meds in system, patient is taking Eliquis 2.5 mg (? a. fib) at home. Will verify from family and will restart.

## 2018-02-22 NOTE — BRIEF OPERATIVE NOTE - PROCEDURE
<<-----Click on this checkbox to enter Procedure EGD w control of hemorrhage w image guidance  02/22/2018  EGD with epinephrine injection and endoscopic hemoclip placement (two clips) at site of arterial bleeding at gastrostomy bumper insertion site.  Active  [id number]

## 2018-02-22 NOTE — CHART NOTE - NSCHARTNOTEFT_GEN_A_CORE
-called to bedside by resident team on this 87F with dementia, aphasia, chronic aspiration, PEG tube.    -Pt. admitted to [facility] for repalcement of clogged PEG tube. Patient taken for IR procedure, PEG tube replaced, new site used as old site was not -re-accessible (per procedure note)  -post op, patient developed gurgling respirations, likely aspiration and was intubated by ER staff.    -On ym arrival to ER found patient with NGT in palce, with profuse bright red blood coming from tube. Patient also hypotenswive and tachycardic. patient also noted to vomit blood/blood clots    -resident team reached out to IR (Dr. courtney) who willr eview case and reccomended CTA.    -however patient became hemodynamically unstable. Urgent right femoral MAC dual lumen introducer placed to intiaite massive transfusion protocl.    -trauma surgeon/resident at bedside as well.    -PAtient given STAT 2 untis PRBC, 2 units FFp, platelets, and K-centra (is on eliquis at home)    -Dr. rojas arrived from GI service, and will perform urgent EGD to eval PEG site, trauma team to be present for EGD to eval if any surgical interventions needed.     -Patient also staretd on levophed for hypotension. titrating for MAP 65-70    Hb was 11.5 on admission, and most recent Hb S/P 2 units PRBC was 8.9,    -protonic 80 Mh ivp given, will start infusion  -thomson pacled by RN team    -GI and anesthesia to perform EGD at bedside in ER    -if EGD un-revealing, and remains HD stable will need CTA abd/pelvis and IR re-consult    -daughter at bedside and updated on plan, treatments, and potential surgical need, no questions at this time      PLAN:  -urgernt bedsdie EGD with Gi/trauma at bedside  -continue levophed for MAP 65-70  -further PRBC's as needed to maintain Hb >8  -repeat labs post EGD  -if EGD un-revealing, will need CTA abd pelvis and IR consult.  -labs showed K+ of 7.4, was given dextrose/insulin/bicarb/calciunm, repeat showed K+ 3.8  -Patient currently on Full vent support  -titrate settings to maintain SaO2 >90%, or pH >7.25  -consider low tidal volume ventilation strategy w/ goal Tv 4-6 cc/kg of ideal body weight  -plateu pressure goal <30  -Peridex oral care  -aggressive pulmonary toilet  -daily sedation vacation with spontaneous breathing trial if clinical condition warrants, discuss with respiratory therapy       45 minutes of critical care time spent at patient's bedside astabalizing blood pressure,titrating pressors, giving blood pruducts, ordering labs, reviewing labs, and discussing care with bedside team, consult services, and family

## 2018-02-22 NOTE — BRIEF OPERATIVE NOTE - COMMENTS
Keep NPO. Leave NGT out so as to not dislodge hemoclips. If pt. rebleeds will need possible surgical intervention. ACS present during EGD> IV Pantoprazole continuous infusion. Family informed. Prognosis remains gded.

## 2018-02-22 NOTE — PROCEDURE NOTE - NSPROCDETAILS_GEN_ALL_CORE
sterile technique, catheter placed/guidewire recovered/ultrasound guidance/lumen(s) aspirated and flushed/sterile dressing applied
location identified, feeding tube inserted

## 2018-02-22 NOTE — BRIEF OPERATIVE NOTE - POST-OP DX
Feeding tube dysfunction  02/22/2018    Active  Alfreda Guillory
Bleeding from gastrostomy tube site  02/22/2018  Active arterial bleeding at site of gastrostomy tube bumper in body of stomach.  Active  Mack Garcia  Feeding tube dysfunction  02/22/2018    Active  Alfreda Guillory

## 2018-02-22 NOTE — CONSULT NOTE ADULT - ASSESSMENT
INTERVAL HPI/OVERNIGHT EVENTS:  The following HPI was obtained from chart/family:    87 years old female w/ hx of PEG placed 2 weeks ago at [facility] due to dysphagia and recurrent aspiration was brought in yesterday for dislodged PEG tube.    Today, IR was consulted and placed Fluoroscopy guided 20 fr gastrostomy tube.    pt at the time intubated; hypotensive SBP to 70's-80's; tachycardic to 110's; NGT in place with approximately 300 cc bloody output; PEG site with mild amount of blood. abdominal exam otherwise with mild diffuse ttp, soft; non-distended.        MEDICATIONS  (STANDING):  chlorhexidine 0.12% Liquid 15 milliLiter(s) Swish and Spit two times a day  dexmedetomidine Infusion 0.2 MICROgram(s)/kG/Hr (3.63 mL/Hr) IV Continuous <Continuous>  levothyroxine Injectable 56 MICROGram(s) IV Push at bedtime  metoclopramide Injectable 10 milliGRAM(s) IV Push once  pantoprazole Infusion 8 mG/Hr (10 mL/Hr) IV Continuous <Continuous>  propofol Infusion 10 MICROgram(s)/kG/Min (4.356 mL/Hr) IV Continuous <Continuous>    MEDICATIONS  (PRN):  fentaNYL    Injectable 75 MICROGram(s) IV Push every 2 hours PRN Moderate Pain (4 - 6)      Vital Signs Last 24 Hrs  T(C): 36.8 (22 Feb 2018 17:38), Max: 36.9 (21 Feb 2018 23:13)  T(F): 98.3 (22 Feb 2018 17:38), Max: 98.5 (21 Feb 2018 23:13)  HR: 94 (22 Feb 2018 21:11) (74 - 123)  BP: 162/68 (22 Feb 2018 21:11) (73/43 - 167/71)  BP(mean): --  RR: 18 (22 Feb 2018 21:11) (15 - 30)  SpO2: 100% (22 Feb 2018 21:11) (91% - 100%)    PE  Gen:  Pulm:  CV:  Abd:  :  Ext:  Vasc:  Neuro:      I&O's Detail    21 Feb 2018 07:01  -  22 Feb 2018 07:00  --------------------------------------------------------  IN:    sodium chloride 0.9%: 125 mL  Total IN: 125 mL    OUT:  Total OUT: 0 mL    Total NET: 125 mL      22 Feb 2018 07:01  -  22 Feb 2018 21:13  --------------------------------------------------------  IN:    sodium chloride 0.9%: 1125 mL  Total IN: 1125 mL    OUT:  Total OUT: 0 mL    Total NET: 1125 mL          LABS:                        8.9    23.6  )-----------( 415      ( 22 Feb 2018 20:19 )             28.8     02-22    136  |  101  |  35.0<H>  ----------------------------<  291<H>  7.4<HH>   |  21.0<L>  |  0.82    Ca    9.4      22 Feb 2018 19:22  Mg     2.3     02-22    TPro  6.1<L>  /  Alb  3.4  /  TBili  0.3<L>  /  DBili  x   /  AST  15  /  ALT  19  /  AlkPhos  106  02-22    PT/INR - ( 21 Feb 2018 21:17 )   PT: 15.5 sec;   INR: 1.40 ratio         PTT - ( 21 Feb 2018 21:17 )  PTT:30.6 sec      RADIOLOGY & ADDITIONAL STUDIES: 88 yo f on eliquis, w/ hx of dislodged PEG which was replaced by IR today now with UGIB.    - Massive tranfusion protocol  - Kcentra  - GI for EGD  - cont w/ NGT  - cont vent support  - Surgery to follow closely in case pt requires surgical intervention     Pt was seen and examined along with Dr. Ibrahim

## 2018-02-22 NOTE — CONSULT NOTE ADULT - SUBJECTIVE AND OBJECTIVE BOX
Patient is a 87y old  Female who presents with a chief complaint of PEG tube is clogged (22 Feb 2018 00:47)      HPI:  History was taken from charts and ED Physician as no family member is available at this time. Called patient's daughter - Monse Newsome but couldn't get in touch. Patient has dementia and is unable to provide any information.   87 years old female brought by EMS with clogged PEG Tube. Patient had PEG placed 2 weeks ago at [facility] due to dysphagia and recurrent aspiration. It was not working since yesterday and patient was taken to [facility] where it was flushed and later patient was discharged home. But it got clogged again so patient was brought to [facility]. Family refused to take her back to [facility].  No fever, abdominal pain, nausea or vomiting. (22 Feb 2018 00:47). This afternoon at roughly 1700 she had an IR placed feeding gastrostomy tube because the old G-tube placed a few weeks ago was non functional so a new G-tube was placed at a different site today. When she returned to the ED after her PEG placement she was stable for roughly one hour then decompensated was intubated and found to have massive UGI bleeding.      REVIEW OF SYSTEMS:   Unobtainable as pt is intubated and sedated.    PAST MEDICAL & SURGICAL HISTORY:  Compression fracture of thoracic vertebra: T12  Hypothyroidism  HLD (hyperlipidemia)  HTN (hypertension)  Dementia  History of hip surgery: B/L  S/P percutaneous endoscopic gastrostomy (PEG) tube placement      FAMILY HISTORY:  No pertinent family history in first degree relatives      SOCIAL HISTORY:  Smoking Status: [ ] Current, [ ] Former, [ ] Never  Pack Years:    MEDICATIONS:  MEDICATIONS  (STANDING):  chlorhexidine 0.12% Liquid 15 milliLiter(s) Swish and Spit two times a day  dexmedetomidine Infusion 0.2 MICROgram(s)/kG/Hr (3.63 mL/Hr) IV Continuous <Continuous>  levothyroxine Injectable 56 MICROGram(s) IV Push at bedtime  metoclopramide Injectable 10 milliGRAM(s) IV Push once  pantoprazole Infusion 8 mG/Hr (10 mL/Hr) IV Continuous <Continuous>  propofol Infusion 10 MICROgram(s)/kG/Min (4.356 mL/Hr) IV Continuous <Continuous>    MEDICATIONS  (PRN):  fentaNYL    Injectable 75 MICROGram(s) IV Push every 2 hours PRN Moderate Pain (4 - 6)      Allergies    No Known Allergies    Intolerances        Vital Signs Last 24 Hrs  T(C): 36.8 (22 Feb 2018 17:38), Max: 36.9 (21 Feb 2018 23:13)  T(F): 98.3 (22 Feb 2018 17:38), Max: 98.5 (21 Feb 2018 23:13)  HR: 93 (22 Feb 2018 21:03) (74 - 123)  BP: 128/79 (22 Feb 2018 21:03) (73/43 - 167/71)  BP(mean): --  RR: 18 (22 Feb 2018 21:03) (15 - 30)  SpO2: 100% (22 Feb 2018 21:03) (91% - 100%)    02-21 @ 07:01  -  02-22 @ 07:00  --------------------------------------------------------  IN: 125 mL / OUT: 0 mL / NET: 125 mL    02-22 @ 07:01  -  02-22 @ 21:08  --------------------------------------------------------  IN: 1125 mL / OUT: 0 mL / NET: 1125 mL      Mode: AC/ CMV (Assist Control/ Continuous Mandatory Ventilation)  RR (machine): 14  TV (machine): 430  FiO2: 60  PEEP: 5  MAP: 9  PIP: 25      PHYSICAL EXAM:    General: Well developed; intubated NGT : Grossly bloody  HEENT: MMM, conjunctiva pale and sclera anicteric.  Gastrointestinal: Soft, G-tube tender,  Extremities: Normal range of motion, No clubbing, cyanosis or edema  Neurological: Sedated  Skin: Warm and dry. No obvious rash      LABS:                        8.9    23.6  )-----------( 415      ( 22 Feb 2018 20:19 )             28.8     02-22    136  |  101  |  35.0<H>  ----------------------------<  291<H>  7.4<HH>   |  21.0<L>  |  0.82    Ca    9.4      22 Feb 2018 19:22  Mg     2.3     02-22    TPro  6.1<L>  /  Alb  3.4  /  TBili  0.3<L>  /  DBili  x   /  AST  15  /  ALT  19  /  AlkPhos  106  02-22          RADIOLOGY & ADDITIONAL STUDIES: Patient is a 87y old  Female who presents with a chief complaint of PEG tube is clogged (22 Feb 2018 00:47)      HPI:  History was taken from charts and ED Physician as no family member is available at this time. Called patient's daughter - Monse Newsome but couldn't get in touch. Patient has dementia and is unable to provide any information.   87 years old female brought by EMS with clogged PEG Tube. Patient had PEG placed 2 weeks ago at [facility] due to dysphagia and recurrent aspiration. It was not working since yesterday and patient was taken to [facility] where it was flushed and later patient was discharged home. But it got clogged again so patient was brought to [facility]. Family refused to take her back to [facility].  No fever, abdominal pain, nausea or vomiting. (22 Feb 2018 00:47). This afternoon at roughly 1700 she had an IR placed feeding gastrostomy tube because the old G-tube placed a few weeks ago was non functional so a new G-tube was placed at a different site today. When she returned to the ED after her PEG placement she was stable for roughly one hour then decompensated was intubated and found to have massive UGI bleeding.      REVIEW OF SYSTEMS:   Unobtainable as pt is intubated and sedated.    PAST MEDICAL & SURGICAL HISTORY:  Compression fracture of thoracic vertebra: T12  Hypothyroidism  HLD (hyperlipidemia)  HTN (hypertension)  Dementia  History of hip surgery: B/L  S/P percutaneous endoscopic gastrostomy (PEG) tube placement      FAMILY HISTORY:  No pertinent family history in first degree relatives      SOCIAL HISTORY: Unknown  Smoking Status: [ ] Current, [ ] Former, [ ] Never  Pack Years:    MEDICATIONS:  MEDICATIONS  (STANDING):  chlorhexidine 0.12% Liquid 15 milliLiter(s) Swish and Spit two times a day  dexmedetomidine Infusion 0.2 MICROgram(s)/kG/Hr (3.63 mL/Hr) IV Continuous <Continuous>  levothyroxine Injectable 56 MICROGram(s) IV Push at bedtime  metoclopramide Injectable 10 milliGRAM(s) IV Push once  pantoprazole Infusion 8 mG/Hr (10 mL/Hr) IV Continuous <Continuous>  propofol Infusion 10 MICROgram(s)/kG/Min (4.356 mL/Hr) IV Continuous <Continuous>    MEDICATIONS  (PRN):  fentaNYL    Injectable 75 MICROGram(s) IV Push every 2 hours PRN Moderate Pain (4 - 6)      Allergies    No Known Allergies    Intolerances        Vital Signs Last 24 Hrs  T(C): 36.8 (22 Feb 2018 17:38), Max: 36.9 (21 Feb 2018 23:13)  T(F): 98.3 (22 Feb 2018 17:38), Max: 98.5 (21 Feb 2018 23:13)  HR: 93 (22 Feb 2018 21:03) (74 - 123)  BP: 128/79 (22 Feb 2018 21:03) (73/43 - 167/71)  BP(mean): --  RR: 18 (22 Feb 2018 21:03) (15 - 30)  SpO2: 100% (22 Feb 2018 21:03) (91% - 100%)    02-21 @ 07:01  -  02-22 @ 07:00  --------------------------------------------------------  IN: 125 mL / OUT: 0 mL / NET: 125 mL    02-22 @ 07:01  -  02-22 @ 21:08  --------------------------------------------------------  IN: 1125 mL / OUT: 0 mL / NET: 1125 mL      Mode: AC/ CMV (Assist Control/ Continuous Mandatory Ventilation)  RR (machine): 14  TV (machine): 430  FiO2: 60  PEEP: 5  MAP: 9  PIP: 25      PHYSICAL EXAM:    General: Well developed; intubated NGT : Grossly bloody  HEENT: MMM, conjunctiva pale and sclera anicteric.  Gastrointestinal: Soft, G-tube site tender, + BS. No ecchymoses at gastrostomy site. G=tube drainage bloody  Extremities: Non edematous   KT: Not done pt. in extremus.  Neurological: Sedated  Skin: Warm and dry. No obvious rash      LABS:                        8.9    23.6  )-----------( 415      ( 22 Feb 2018 20:19 )             28.8     02-22    136  |  101  |  35.0<H>  ----------------------------<  291<H>  7.4<HH>   |  21.0<L>  |  0.82    Ca    9.4      22 Feb 2018 19:22  Mg     2.3     02-22    TPro  6.1<L>  /  Alb  3.4  /  TBili  0.3<L>  /  DBili  x   /  AST  15  /  ALT  19  /  AlkPhos  106  02-22          RADIOLOGY & ADDITIONAL STUDIES:

## 2018-02-22 NOTE — CHART NOTE - NSCHARTNOTEFT_GEN_A_CORE
-patient arrived to MICU after EGD.  -arterial bleed found by GI underneath PEG balloon, epi cautery and 2 clips placed with hemostasis    -3rd and 4th unit of PRBC infusing now    -will repeat labs once blood complete    -f/u repeat CXR

## 2018-02-22 NOTE — CONSULT NOTE ADULT - PROBLEM SELECTOR RECOMMENDATION 9
EGD now. ACS here. prognosis guarded. Emergent EGD now in ER as pt bleeding too heavily to transport to MICU.. ACS here. Prognosis guarded. Daughter to provide consent for procedure and sedation.

## 2018-02-22 NOTE — CONSULT NOTE ADULT - SUBJECTIVE AND OBJECTIVE BOX
INTERVAL HPI/OVERNIGHT EVENTS:  The following  hx was obtained from chart/family:    87 years old female w/ hx of PEG placement 2 weeks ago at [facility] for dysphagia and recurrent aspiration was brought in yesterday for dislodged PEG tube.    Today, IR was consulted and placed Fluoroscopy guided 20 fr gastrostomy tube.   Of note pt was diagnosed with having "a clot", and was placed on eliquis. family uncertain of the location of the thrombus.   Surgery was called in the late afternoon for pt having bloody output of the NGT while being hypotensive.  When surgery team arrived at bedside, pt at the time intubated; hypotensive SBP to 70's-80's; tachycardic to 110's; NGT in place with approximately 300 cc bloody output; PEG site with mild amount of blood. abdominal exam otherwise with mild diffuse ttp, soft; non-distended.      MEDICATIONS  (STANDING):  chlorhexidine 0.12% Liquid 15 milliLiter(s) Swish and Spit two times a day  dexmedetomidine Infusion 0.2 MICROgram(s)/kG/Hr (3.63 mL/Hr) IV Continuous <Continuous>  levothyroxine Injectable 56 MICROGram(s) IV Push at bedtime  metoclopramide Injectable 10 milliGRAM(s) IV Push once  pantoprazole Infusion 8 mG/Hr (10 mL/Hr) IV Continuous <Continuous>  propofol Infusion 10 MICROgram(s)/kG/Min (4.356 mL/Hr) IV Continuous <Continuous>    MEDICATIONS  (PRN):  fentaNYL    Injectable 75 MICROGram(s) IV Push every 2 hours PRN Moderate Pain (4 - 6)      Vital Signs Last 24 Hrs  T(C): 36.8 (22 Feb 2018 17:38), Max: 36.9 (21 Feb 2018 23:13)  T(F): 98.3 (22 Feb 2018 17:38), Max: 98.5 (21 Feb 2018 23:13)  HR: 96 (22 Feb 2018 21:14) (74 - 123)  BP: 145/65 (22 Feb 2018 21:14) (73/43 - 167/71)  BP(mean): --  RR: 18 (22 Feb 2018 21:14) (15 - 30)  SpO2: 100% (22 Feb 2018 21:14) (91% - 100%)    PE  Gen: Intubated/sedated/NAD  Pulm: on vent support  CV: tachycardic to 110's; regular rhythm  Abd: s/mild-diffuse ttp/non-distended; PEG site with mild amount of bleeding. NGT in place w/ approximately 300cc bloody output  Ext: lower extremities warm, dry      I&O's Detail    21 Feb 2018 07:01  -  22 Feb 2018 07:00  --------------------------------------------------------  IN:    sodium chloride 0.9%: 125 mL  Total IN: 125 mL    OUT:  Total OUT: 0 mL    Total NET: 125 mL      22 Feb 2018 07:01  -  22 Feb 2018 21:27  --------------------------------------------------------  IN:    sodium chloride 0.9%: 1125 mL  Total IN: 1125 mL    OUT:  Total OUT: 0 mL    Total NET: 1125 mL          LABS:                        8.9    23.6  )-----------( 415      ( 22 Feb 2018 20:19 )             28.8     02-22    136  |  101  |  35.0<H>  ----------------------------<  291<H>  7.4<HH>   |  21.0<L>  |  0.82    Ca    9.4      22 Feb 2018 19:22  Mg     2.3     02-22    TPro  6.1<L>  /  Alb  3.4  /  TBili  0.3<L>  /  DBili  x   /  AST  15  /  ALT  19  /  AlkPhos  106  02-22    PT/INR - ( 21 Feb 2018 21:17 )   PT: 15.5 sec;   INR: 1.40 ratio         PTT - ( 21 Feb 2018 21:17 )  PTT:30.6 sec      RADIOLOGY & ADDITIONAL STUDIES: INTERVAL HPI/OVERNIGHT EVENTS:  The following  hx was obtained from chart/family:    87 years old female w/ hx of PEG placement 2 weeks ago at [facility] for dysphagia and recurrent aspiration was brought in yesterday for dislodged PEG tube.    Today, IR was consulted and placed Fluoroscopy guided 20 fr gastrostomy tube.   Of note pt was diagnosed with having "a clot", and was placed on eliquis. family uncertain of the location of the thrombus.   Surgery was called in the late afternoon for pt having bloody output of the NGT while being hypotensive.  When surgery team arrived at bedside, pt at the time intubated; hypotensive SBP to 70's-80's; tachycardic to 110's; NGT in place with approximately 300 cc bloody output; PEG site with mild amount of blood. abdominal exam otherwise with mild diffuse ttp, soft; non-distended.    PMH/PSH:  Compression fracture of thoracic vertebra  T12  Dementia    HLD (hyperlipidemia)    HTN (hypertension)    Hypothyroidism.  hx of appendectomy  hx of b/l hip replacement  hx of R knee replacement  hx of thyroidectomy    ROS: deferred at this time      MEDICATIONS  (STANDING):  chlorhexidine 0.12% Liquid 15 milliLiter(s) Swish and Spit two times a day  dexmedetomidine Infusion 0.2 MICROgram(s)/kG/Hr (3.63 mL/Hr) IV Continuous <Continuous>  levothyroxine Injectable 56 MICROGram(s) IV Push at bedtime  metoclopramide Injectable 10 milliGRAM(s) IV Push once  pantoprazole Infusion 8 mG/Hr (10 mL/Hr) IV Continuous <Continuous>  propofol Infusion 10 MICROgram(s)/kG/Min (4.356 mL/Hr) IV Continuous <Continuous>    MEDICATIONS  (PRN):  fentaNYL    Injectable 75 MICROGram(s) IV Push every 2 hours PRN Moderate Pain (4 - 6)      Vital Signs Last 24 Hrs  T(C): 36.8 (22 Feb 2018 17:38), Max: 36.9 (21 Feb 2018 23:13)  T(F): 98.3 (22 Feb 2018 17:38), Max: 98.5 (21 Feb 2018 23:13)  HR: 96 (22 Feb 2018 21:14) (74 - 123)  BP: 145/65 (22 Feb 2018 21:14) (73/43 - 167/71)  BP(mean): --  RR: 18 (22 Feb 2018 21:14) (15 - 30)  SpO2: 100% (22 Feb 2018 21:14) (91% - 100%)    PE  Gen: Intubated/sedated/NAD  Pulm: on vent support  CV: tachycardic to 110's; regular rhythm  Abd: s/mild-diffuse ttp/non-distended; PEG site with mild amount of bleeding. NGT in place w/ approximately 300cc bloody output  Ext: lower extremities warm, dry      I&O's Detail    21 Feb 2018 07:01  -  22 Feb 2018 07:00  --------------------------------------------------------  IN:    sodium chloride 0.9%: 125 mL  Total IN: 125 mL    OUT:  Total OUT: 0 mL    Total NET: 125 mL      22 Feb 2018 07:01  -  22 Feb 2018 21:27  --------------------------------------------------------  IN:    sodium chloride 0.9%: 1125 mL  Total IN: 1125 mL    OUT:  Total OUT: 0 mL    Total NET: 1125 mL          LABS:                        8.9    23.6  )-----------( 415      ( 22 Feb 2018 20:19 )             28.8     02-22    136  |  101  |  35.0<H>  ----------------------------<  291<H>  7.4<HH>   |  21.0<L>  |  0.82    Ca    9.4      22 Feb 2018 19:22  Mg     2.3     02-22    TPro  6.1<L>  /  Alb  3.4  /  TBili  0.3<L>  /  DBili  x   /  AST  15  /  ALT  19  /  AlkPhos  106  02-22    PT/INR - ( 21 Feb 2018 21:17 )   PT: 15.5 sec;   INR: 1.40 ratio         PTT - ( 21 Feb 2018 21:17 )  PTT:30.6 sec      RADIOLOGY & ADDITIONAL STUDIES:

## 2018-02-22 NOTE — H&P ADULT - HISTORY OF PRESENT ILLNESS
History was taken from charts and ED Physician as no family member is available at this time. Called patient's daughter - Monse Newsome but couldn't get in touch. Patient has dementia and is unable to provide any information.   87 years old female brought by EMS with clogged PEG Tube. Patient had PEG placed 2 weeks ago at [facility] due to dysphagia and recurrent aspiration. It was not working since yesterday and patient was taken to [facility] where it was flushed and later patient was discharged home. But it got clogged again so patient was brought to [facility]. Family refused to take her to [facility].  No fever, abdominal pain, nausea or vomiting. History was taken from charts and ED Physician as no family member is available at this time. Called patient's daughter - Monse Newsome but couldn't get in touch. Patient has dementia and is unable to provide any information.   87 years old female brought by EMS with clogged PEG Tube. Patient had PEG placed 2 weeks ago at [facility] due to dysphagia and recurrent aspiration. It was not working since yesterday and patient was taken to [facility] where it was flushed and later patient was discharged home. But it got clogged again so patient was brought to [facility]. Family refused to take her back to [facility].  No fever, abdominal pain, nausea or vomiting.

## 2018-02-22 NOTE — BRIEF OPERATIVE NOTE - PROCEDURE
<<-----Click on this checkbox to enter Procedure Gastrostomy feeding tube placement  02/22/2018    Active  RITA

## 2018-02-23 ENCOUNTER — TRANSCRIPTION ENCOUNTER (OUTPATIENT)
Age: 83
End: 2018-02-23

## 2018-02-23 DIAGNOSIS — D50.0 IRON DEFICIENCY ANEMIA SECONDARY TO BLOOD LOSS (CHRONIC): ICD-10-CM

## 2018-02-23 DIAGNOSIS — F03.90 UNSPECIFIED DEMENTIA WITHOUT BEHAVIORAL DISTURBANCE: ICD-10-CM

## 2018-02-23 DIAGNOSIS — K94.21 GASTROSTOMY HEMORRHAGE: ICD-10-CM

## 2018-02-23 DIAGNOSIS — R57.9 SHOCK, UNSPECIFIED: ICD-10-CM

## 2018-02-23 DIAGNOSIS — J96.00 ACUTE RESPIRATORY FAILURE, UNSPECIFIED WHETHER WITH HYPOXIA OR HYPERCAPNIA: ICD-10-CM

## 2018-02-23 DIAGNOSIS — Z51.5 ENCOUNTER FOR PALLIATIVE CARE: ICD-10-CM

## 2018-02-23 LAB
ALBUMIN SERPL ELPH-MCNC: 3.2 G/DL — LOW (ref 3.3–5.2)
ALP SERPL-CCNC: 78 U/L — SIGNIFICANT CHANGE UP (ref 40–120)
ALT FLD-CCNC: 20 U/L — SIGNIFICANT CHANGE UP
ANION GAP SERPL CALC-SCNC: 11 MMOL/L — SIGNIFICANT CHANGE UP (ref 5–17)
ANION GAP SERPL CALC-SCNC: 12 MMOL/L — SIGNIFICANT CHANGE UP (ref 5–17)
ANION GAP SERPL CALC-SCNC: 13 MMOL/L — SIGNIFICANT CHANGE UP (ref 5–17)
ANION GAP SERPL CALC-SCNC: 18 MMOL/L — HIGH (ref 5–17)
APPEARANCE UR: ABNORMAL
APTT BLD: 26.3 SEC — LOW (ref 27.5–37.4)
AST SERPL-CCNC: 20 U/L — SIGNIFICANT CHANGE UP
BACTERIA # UR AUTO: ABNORMAL
BILIRUB SERPL-MCNC: 1.3 MG/DL — SIGNIFICANT CHANGE UP (ref 0.4–2)
BILIRUB UR-MCNC: NEGATIVE — SIGNIFICANT CHANGE UP
BUN SERPL-MCNC: 31 MG/DL — HIGH (ref 8–20)
BUN SERPL-MCNC: 43 MG/DL — HIGH (ref 8–20)
BUN SERPL-MCNC: 52 MG/DL — HIGH (ref 8–20)
BUN SERPL-MCNC: 62 MG/DL — HIGH (ref 8–20)
CALCIUM SERPL-MCNC: 8.6 MG/DL — SIGNIFICANT CHANGE UP (ref 8.6–10.2)
CALCIUM SERPL-MCNC: 8.6 MG/DL — SIGNIFICANT CHANGE UP (ref 8.6–10.2)
CALCIUM SERPL-MCNC: 9.3 MG/DL — SIGNIFICANT CHANGE UP (ref 8.6–10.2)
CALCIUM SERPL-MCNC: 9.6 MG/DL — SIGNIFICANT CHANGE UP (ref 8.6–10.2)
CHLORIDE SERPL-SCNC: 108 MMOL/L — HIGH (ref 98–107)
CHLORIDE SERPL-SCNC: 111 MMOL/L — HIGH (ref 98–107)
CO2 SERPL-SCNC: 20 MMOL/L — LOW (ref 22–29)
CO2 SERPL-SCNC: 22 MMOL/L — SIGNIFICANT CHANGE UP (ref 22–29)
CO2 SERPL-SCNC: 22 MMOL/L — SIGNIFICANT CHANGE UP (ref 22–29)
CO2 SERPL-SCNC: 23 MMOL/L — SIGNIFICANT CHANGE UP (ref 22–29)
COLOR SPEC: SIGNIFICANT CHANGE UP
CREAT SERPL-MCNC: 0.55 MG/DL — SIGNIFICANT CHANGE UP (ref 0.5–1.3)
CREAT SERPL-MCNC: 0.66 MG/DL — SIGNIFICANT CHANGE UP (ref 0.5–1.3)
CREAT SERPL-MCNC: 0.67 MG/DL — SIGNIFICANT CHANGE UP (ref 0.5–1.3)
CREAT SERPL-MCNC: 0.72 MG/DL — SIGNIFICANT CHANGE UP (ref 0.5–1.3)
DIFF PNL FLD: ABNORMAL
EPI CELLS # UR: SIGNIFICANT CHANGE UP
GLUCOSE SERPL-MCNC: 113 MG/DL — SIGNIFICANT CHANGE UP (ref 70–115)
GLUCOSE SERPL-MCNC: 129 MG/DL — HIGH (ref 70–115)
GLUCOSE SERPL-MCNC: 212 MG/DL — HIGH (ref 70–115)
GLUCOSE SERPL-MCNC: 90 MG/DL — SIGNIFICANT CHANGE UP (ref 70–115)
GLUCOSE UR QL: 100 MG/DL
HCT VFR BLD CALC: 33.5 % — LOW (ref 37–47)
HCT VFR BLD CALC: 35.7 % — LOW (ref 37–47)
HGB BLD-MCNC: 11.3 G/DL — LOW (ref 12–16)
HGB BLD-MCNC: 11.9 G/DL — LOW (ref 12–16)
INR BLD: 1.14 RATIO — SIGNIFICANT CHANGE UP (ref 0.88–1.16)
KETONES UR-MCNC: NEGATIVE — SIGNIFICANT CHANGE UP
LACTATE BLDV-MCNC: 1.8 MMOL/L — SIGNIFICANT CHANGE UP (ref 0.5–2)
LACTATE BLDV-MCNC: 2.8 MMOL/L — HIGH (ref 0.5–2)
LEUKOCYTE ESTERASE UR-ACNC: ABNORMAL
MAGNESIUM SERPL-MCNC: 1.7 MG/DL — SIGNIFICANT CHANGE UP (ref 1.6–2.6)
MAGNESIUM SERPL-MCNC: 1.8 MG/DL — SIGNIFICANT CHANGE UP (ref 1.6–2.6)
MCHC RBC-ENTMCNC: 29.4 PG — SIGNIFICANT CHANGE UP (ref 27–31)
MCHC RBC-ENTMCNC: 29.7 PG — SIGNIFICANT CHANGE UP (ref 27–31)
MCHC RBC-ENTMCNC: 33.3 G/DL — SIGNIFICANT CHANGE UP (ref 32–36)
MCHC RBC-ENTMCNC: 33.7 G/DL — SIGNIFICANT CHANGE UP (ref 32–36)
MCV RBC AUTO: 87.2 FL — SIGNIFICANT CHANGE UP (ref 81–99)
MCV RBC AUTO: 89 FL — SIGNIFICANT CHANGE UP (ref 81–99)
NITRITE UR-MCNC: NEGATIVE — SIGNIFICANT CHANGE UP
PH UR: 6 — SIGNIFICANT CHANGE UP (ref 5–8)
PHOSPHATE SERPL-MCNC: 3.3 MG/DL — SIGNIFICANT CHANGE UP (ref 2.4–4.7)
PHOSPHATE SERPL-MCNC: 3.7 MG/DL — SIGNIFICANT CHANGE UP (ref 2.4–4.7)
PLATELET # BLD AUTO: 230 K/UL — SIGNIFICANT CHANGE UP (ref 150–400)
PLATELET # BLD AUTO: 235 K/UL — SIGNIFICANT CHANGE UP (ref 150–400)
POTASSIUM SERPL-MCNC: 3.5 MMOL/L — SIGNIFICANT CHANGE UP (ref 3.5–5.3)
POTASSIUM SERPL-MCNC: 4.4 MMOL/L — SIGNIFICANT CHANGE UP (ref 3.5–5.3)
POTASSIUM SERPL-MCNC: 5.7 MMOL/L — HIGH (ref 3.5–5.3)
POTASSIUM SERPL-MCNC: 5.8 MMOL/L — HIGH (ref 3.5–5.3)
POTASSIUM SERPL-SCNC: 3.5 MMOL/L — SIGNIFICANT CHANGE UP (ref 3.5–5.3)
POTASSIUM SERPL-SCNC: 4.4 MMOL/L — SIGNIFICANT CHANGE UP (ref 3.5–5.3)
POTASSIUM SERPL-SCNC: 5.7 MMOL/L — HIGH (ref 3.5–5.3)
POTASSIUM SERPL-SCNC: 5.8 MMOL/L — HIGH (ref 3.5–5.3)
PROCALCITONIN SERPL-MCNC: 1.32 NG/ML — HIGH (ref 0–0.04)
PROT SERPL-MCNC: 5.5 G/DL — LOW (ref 6.6–8.7)
PROT UR-MCNC: 15 MG/DL
PROTHROM AB SERPL-ACNC: 12.6 SEC — SIGNIFICANT CHANGE UP (ref 9.8–12.7)
RBC # BLD: 3.84 M/UL — LOW (ref 4.4–5.2)
RBC # BLD: 4.01 M/UL — LOW (ref 4.4–5.2)
RBC # FLD: 14.7 % — SIGNIFICANT CHANGE UP (ref 11–15.6)
RBC # FLD: 14.9 % — SIGNIFICANT CHANGE UP (ref 11–15.6)
RBC CASTS # UR COMP ASSIST: ABNORMAL /HPF (ref 0–4)
SODIUM SERPL-SCNC: 145 MMOL/L — SIGNIFICANT CHANGE UP (ref 135–145)
SODIUM SERPL-SCNC: 145 MMOL/L — SIGNIFICANT CHANGE UP (ref 135–145)
SODIUM SERPL-SCNC: 146 MMOL/L — HIGH (ref 135–145)
SODIUM SERPL-SCNC: 146 MMOL/L — HIGH (ref 135–145)
SP GR SPEC: 1.02 — SIGNIFICANT CHANGE UP (ref 1.01–1.02)
UROBILINOGEN FLD QL: NEGATIVE MG/DL — SIGNIFICANT CHANGE UP
WBC # BLD: 14 K/UL — HIGH (ref 4.8–10.8)
WBC # BLD: 15.5 K/UL — HIGH (ref 4.8–10.8)
WBC # FLD AUTO: 14 K/UL — HIGH (ref 4.8–10.8)
WBC # FLD AUTO: 15.5 K/UL — HIGH (ref 4.8–10.8)
WBC UR QL: >50

## 2018-02-23 PROCEDURE — 99222 1ST HOSP IP/OBS MODERATE 55: CPT

## 2018-02-23 PROCEDURE — 99291 CRITICAL CARE FIRST HOUR: CPT

## 2018-02-23 PROCEDURE — 99233 SBSQ HOSP IP/OBS HIGH 50: CPT

## 2018-02-23 PROCEDURE — 93970 EXTREMITY STUDY: CPT | Mod: 26

## 2018-02-23 RX ORDER — MAGNESIUM SULFATE 500 MG/ML
2 VIAL (ML) INJECTION ONCE
Qty: 0 | Refills: 0 | Status: COMPLETED | OUTPATIENT
Start: 2018-02-23 | End: 2018-02-23

## 2018-02-23 RX ORDER — DEXMEDETOMIDINE HYDROCHLORIDE IN 0.9% SODIUM CHLORIDE 4 UG/ML
0.5 INJECTION INTRAVENOUS
Qty: 200 | Refills: 0 | Status: DISCONTINUED | OUTPATIENT
Start: 2018-02-23 | End: 2018-02-24

## 2018-02-23 RX ORDER — CALCIUM GLUCONATE 100 MG/ML
2 VIAL (ML) INTRAVENOUS ONCE
Qty: 0 | Refills: 0 | Status: COMPLETED | OUTPATIENT
Start: 2018-02-23 | End: 2018-02-23

## 2018-02-23 RX ORDER — POTASSIUM CHLORIDE 20 MEQ
20 PACKET (EA) ORAL
Qty: 0 | Refills: 0 | Status: COMPLETED | OUTPATIENT
Start: 2018-02-23 | End: 2018-02-23

## 2018-02-23 RX ORDER — SODIUM CHLORIDE 9 MG/ML
1000 INJECTION, SOLUTION INTRAVENOUS ONCE
Qty: 0 | Refills: 0 | Status: COMPLETED | OUTPATIENT
Start: 2018-02-23 | End: 2018-02-23

## 2018-02-23 RX ORDER — CALCIUM GLUCONATE 100 MG/ML
1 VIAL (ML) INTRAVENOUS DAILY
Qty: 0 | Refills: 0 | Status: DISCONTINUED | OUTPATIENT
Start: 2018-02-23 | End: 2018-02-28

## 2018-02-23 RX ORDER — INSULIN HUMAN 100 [IU]/ML
10 INJECTION, SOLUTION SUBCUTANEOUS ONCE
Qty: 0 | Refills: 0 | Status: COMPLETED | OUTPATIENT
Start: 2018-02-23 | End: 2018-02-23

## 2018-02-23 RX ORDER — DEXTROSE 50 % IN WATER 50 %
50 SYRINGE (ML) INTRAVENOUS ONCE
Qty: 0 | Refills: 0 | Status: COMPLETED | OUTPATIENT
Start: 2018-02-23 | End: 2018-02-23

## 2018-02-23 RX ORDER — ACETAMINOPHEN 500 MG
1000 TABLET ORAL ONCE
Qty: 0 | Refills: 0 | Status: COMPLETED | OUTPATIENT
Start: 2018-02-23 | End: 2018-02-23

## 2018-02-23 RX ORDER — INSULIN HUMAN 100 [IU]/ML
10 INJECTION, SOLUTION SUBCUTANEOUS ONCE
Qty: 0 | Refills: 0 | Status: DISCONTINUED | OUTPATIENT
Start: 2018-02-23 | End: 2018-02-23

## 2018-02-23 RX ADMIN — DEXMEDETOMIDINE HYDROCHLORIDE IN 0.9% SODIUM CHLORIDE 9.07 MICROGRAM(S)/KG/HR: 4 INJECTION INTRAVENOUS at 05:54

## 2018-02-23 RX ADMIN — Medication 400 MILLIGRAM(S): at 20:41

## 2018-02-23 RX ADMIN — Medication 56 MICROGRAM(S): at 21:36

## 2018-02-23 RX ADMIN — Medication 200 GRAM(S): at 11:55

## 2018-02-23 RX ADMIN — INSULIN HUMAN 10 UNIT(S): 100 INJECTION, SOLUTION SUBCUTANEOUS at 10:57

## 2018-02-23 RX ADMIN — Medication 50 MILLILITER(S): at 10:57

## 2018-02-23 RX ADMIN — PANTOPRAZOLE SODIUM 10 MG/HR: 20 TABLET, DELAYED RELEASE ORAL at 06:37

## 2018-02-23 RX ADMIN — FENTANYL CITRATE 75 MICROGRAM(S): 50 INJECTION INTRAVENOUS at 03:40

## 2018-02-23 RX ADMIN — CHLORHEXIDINE GLUCONATE 15 MILLILITER(S): 213 SOLUTION TOPICAL at 19:02

## 2018-02-23 RX ADMIN — Medication 200 GRAM(S): at 00:16

## 2018-02-23 RX ADMIN — CHLORHEXIDINE GLUCONATE 15 MILLILITER(S): 213 SOLUTION TOPICAL at 05:54

## 2018-02-23 RX ADMIN — SODIUM CHLORIDE 500 MILLILITER(S): 9 INJECTION, SOLUTION INTRAVENOUS at 06:37

## 2018-02-23 RX ADMIN — Medication 1000 MILLIGRAM(S): at 21:19

## 2018-02-23 RX ADMIN — FENTANYL CITRATE 75 MICROGRAM(S): 50 INJECTION INTRAVENOUS at 03:14

## 2018-02-23 RX ADMIN — Medication 50 MILLIEQUIVALENT(S): at 03:14

## 2018-02-23 RX ADMIN — Medication 50 MILLIEQUIVALENT(S): at 01:17

## 2018-02-23 RX ADMIN — Medication 50 GRAM(S): at 08:25

## 2018-02-23 NOTE — PATIENT PROFILE ADULT. - VISION (WITH CORRECTIVE LENSES IF THE PATIENT USUALLY WEARS THEM):
hx cataracts/Partially impaired: cannot see medication labels or newsprint, but can see obstacles in path, and the surrounding layout; can count fingers at arm's length

## 2018-02-23 NOTE — CONSULT NOTE ADULT - SUBJECTIVE AND OBJECTIVE BOX
HPI: 87F with PMH as listed admitted  with PEG tube malfunction, required reinsertion, post procedure complicated by hemorrhagic shock, respiratory failure intubated, s/p emergent EGD by GI last night found to have active bleeding at gastrostomy tube insertion site in proximal body of stomach, that was clipped. She is now s/p extubation.      PERTINENT PMH REVIEWED: Yes     PAST MEDICAL & SURGICAL HISTORY:  Compression fracture of thoracic vertebra: T12  Hypothyroidism  HLD (hyperlipidemia)  HTN (hypertension)  Dementia  History of hip surgery: B/L  S/P percutaneous endoscopic gastrostomy (PEG) tube placement    SOCIAL HISTORY:  lives with daughter 24 hr care                                    Admitted from:  home    Surrogate - daughter - Monse      FAMILY HISTORY:  No pertinent family history in first degree relatives    Baseline ADLs (prior to admission):  Dependent      Allergies    No Known Allergies    Present Symptoms:     Dyspnea:   Nausea/Vomiting: Yes No  Anxiety:  Yes No  Depression: Yes No  Fatigue: Yes No  Loss of appetite: Yes No    Pain:             Character-            Duration-            Effect-            Factors-            Frequency-            Location-            Severity-    Review of Systems: Reviewed                     Negative:                     Positive:  Unable to obtain due to poor mentation   All others negative    MEDICATIONS  (STANDING):  calcium gluconate IVPB 1 Gram(s) IV Intermittent daily  chlorhexidine 0.12% Liquid 15 milliLiter(s) Swish and Spit two times a day  dexmedetomidine Infusion 0.5 MICROgram(s)/kG/Hr (9.075 mL/Hr) IV Continuous <Continuous>  levothyroxine Injectable 56 MICROGram(s) IV Push at bedtime  pantoprazole Infusion 8 mG/Hr (10 mL/Hr) IV Continuous <Continuous>    MEDICATIONS  (PRN):    PHYSICAL EXAM:    Vital Signs Last 24 Hrs  T(C): 37.9 (2018 12:00), Max: 37.9 (2018 12:00)  T(F): 100.2 (2018 12:00), Max: 100.2 (2018 12:00)  HR: 118 (2018 12:00) (78 - 130)  BP: 87/54 (2018 12:00) (60/30 - 190/91)  BP(mean): 67 (2018 12:00) (44 - 131)  RR: 14 (2018 12:00) (14 - 30)  SpO2: 99% (2018 12:00) (91% - 100%)    General: alert  oriented x ____ lethargic agitated                  cachexia  nonverbal  coma    Karnofsky:  %    HEENT: normal  dry mouth  ET tube/trach    Lungs: comfortable tachypnea/labored breathing  excessive secretions    CV: normal  tachycardia    GI: normal  distended  tender  no BS               PEG/NG/OG tube  constipation  last BM:     : normal  incontinent  oliguria/anuria  thomson    MSK: normal  weakness  edema             ambulatory  bedbound/wheelchair bound    Skin: normal  pressure ulcers- Stage_____  no rash    LABS:                       11.3   14.0  )-----------( 235      ( 2018 04:41 )             33.5         145  |  111<H>  |  52.0<H>  ----------------------------<  129<H>  5.8<H>   |  22.0  |  0.67    Ca    8.6      2018 08:07  Phos  3.3       Mg     1.8         TPro  5.5<L>  /  Alb  3.2<L>  /  TBili  1.3  /  DBili  x   /  AST  20  /  ALT  20  /  AlkPhos  78      PT/INR - ( 2018 23:57 )   PT: 12.6 sec;   INR: 1.14 ratio       PTT - ( 2018 23:57 )  PTT:26.3 sec  Urinalysis Basic - ( 2018 23:59 )    Color: Pale Yellow / Appearance: Slightly Turbid / S.020 / pH: x  Gluc: x / Ketone: Negative  / Bili: Negative / Urobili: Negative mg/dL   Blood: x / Protein: 15 mg/dL / Nitrite: Negative   Leuk Esterase: Moderate / RBC: 3-5 /HPF / WBC >50   Sq Epi: x / Non Sq Epi: Occasional / Bacteria: Few      I&O's Summary    2018 07:01  -  2018 07:00  --------------------------------------------------------  IN: 3156.2 mL / OUT: 640 mL / NET: 2516.2 mL    2018 07:01  -  2018 12:53  --------------------------------------------------------  IN: 155.4 mL / OUT: 305 mL / NET: -149.6 mL        RADIOLOGY & ADDITIONAL STUDIES:    ADVANCE DIRECTIVES:   DNR YES NO  Completed on:                     MOLST  YES NO   Completed on:  Living Will  YES NO   Completed on: HPI: 87F with PMH as listed admitted  with PEG tube malfunction, required reinsertion, post procedure complicated by hemorrhagic shock, respiratory failure intubated, s/p emergent EGD by GI last night found to have active bleeding at gastrostomy tube insertion site in proximal body of stomach, that was clipped. She is now s/p extubation.      PERTINENT PMH REVIEWED: Yes     PAST MEDICAL & SURGICAL HISTORY:  Compression fracture of thoracic vertebra: T12  Hypothyroidism  HLD (hyperlipidemia)  HTN (hypertension)  Dementia  History of hip surgery: B/L  S/P percutaneous endoscopic gastrostomy (PEG) tube placement    SOCIAL HISTORY:  lives with daughter 24 hr care                                    Admitted from:  home    Surrogate - daughter - Monse      FAMILY HISTORY:  No pertinent family history in first degree relatives    Baseline ADLs (prior to admission):  Dependent      Allergies    No Known Allergies    Present Symptoms:     Dyspnea:   Nausea/Vomiting: Yes No  Anxiety:  Yes No  Depression: Yes No  Fatigue: Yes No  Loss of appetite: Yes No    Pain:             Character-            Duration-            Effect-            Factors-            Frequency-            Location-            Severity-    Review of Systems: Reviewed                     Negative:                     Positive:  Unable to obtain due to poor mentation   All others negative    MEDICATIONS  (STANDING):  calcium gluconate IVPB 1 Gram(s) IV Intermittent daily  chlorhexidine 0.12% Liquid 15 milliLiter(s) Swish and Spit two times a day  dexmedetomidine Infusion 0.5 MICROgram(s)/kG/Hr (9.075 mL/Hr) IV Continuous <Continuous>  levothyroxine Injectable 56 MICROGram(s) IV Push at bedtime  pantoprazole Infusion 8 mG/Hr (10 mL/Hr) IV Continuous <Continuous>    MEDICATIONS  (PRN):    PHYSICAL EXAM:    Vital Signs Last 24 Hrs  T(C): 37.9 (2018 12:00), Max: 37.9 (2018 12:00)  T(F): 100.2 (2018 12:00), Max: 100.2 (2018 12:00)  HR: 118 (2018 12:00) (78 - 130)  BP: 87/54 (2018 12:00) (60/30 - 190/91)  BP(mean): 67 (2018 12:00) (44 - 131)  RR: 14 (2018 12:00) (14 - 30)  SpO2: 99% (2018 12:00) (91% - 100%)    General: alert  oriented x ____ lethargic agitated                  cachexia  nonverbal  coma    Karnofsky:  %    HEENT: normal  dry mouth  ET tube/trach    Lungs: comfortable tachypnea/labored breathing  excessive secretions    CV: normal  tachycardia    GI: normal  distended  tender  no BS               PEG/NG/OG tube  constipation  last BM:     : normal  incontinent  oliguria/anuria  thomson    MSK: normal  weakness  edema             ambulatory  bedbound/wheelchair bound    Skin: normal  pressure ulcers- Stage_____  no rash    LABS:                       11.3   14.0  )-----------( 235      ( 2018 04:41 )             33.5         145  |  111<H>  |  52.0<H>  ----------------------------<  129<H>  5.8<H>   |  22.0  |  0.67    Ca    8.6      2018 08:07  Phos  3.3       Mg     1.8         TPro  5.5<L>  /  Alb  3.2<L>  /  TBili  1.3  /  DBili  x   /  AST  20  /  ALT  20  /  AlkPhos  78      PT/INR - ( 2018 23:57 )   PT: 12.6 sec;   INR: 1.14 ratio       PTT - ( 2018 23:57 )  PTT:26.3 sec  Urinalysis Basic - ( 2018 23:59 )    Color: Pale Yellow / Appearance: Slightly Turbid / S.020 / pH: x  Gluc: x / Ketone: Negative  / Bili: Negative / Urobili: Negative mg/dL   Blood: x / Protein: 15 mg/dL / Nitrite: Negative   Leuk Esterase: Moderate / RBC: 3-5 /HPF / WBC >50   Sq Epi: x / Non Sq Epi: Occasional / Bacteria: Few    I&O's Summary    2018 07:01  -  2018 07:00  --------------------------------------------------------  IN: 3156.2 mL / OUT: 640 mL / NET: 2516.2 mL    2018 07:01  -  2018 12:53  --------------------------------------------------------  IN: 155.4 mL / OUT: 305 mL / NET: -149.6 mL    RADIOLOGY & ADDITIONAL STUDIES:  < from: US Duplex Venous Lower Ext Complete, Bilateral (18 @ 11:38) >    No evidence of bilateral lower extremity deep venous thrombosis.    < from: Xray Chest 1 View-PORTABLE IMMEDIATE (18 @ 23:40) >  Impression: No acute pulmonary disease. Lungs are clear and unchanged.    < from: Xray Chest 1 View- PORTABLE-Urgent (18 @ 19:50) >  NG tube tip in the right main bronchus. Repositioning recommended.    ADVANCE DIRECTIVES: Full Code HPI: 87F with PMH as listed admitted  with PEG tube malfunction, required reinsertion, post procedure complicated by hemorrhagic shock, respiratory failure intubated, s/p emergent EGD by GI last night found to have active bleeding at gastrostomy tube insertion site in proximal body of stomach, that was clipped. She is now s/p extubation.      PERTINENT PMH REVIEWED: Yes     PAST MEDICAL & SURGICAL HISTORY:  Compression fracture of thoracic vertebra: T12  Hypothyroidism  HLD (hyperlipidemia)  HTN (hypertension)  Dementia  History of hip surgery: B/L  S/P percutaneous endoscopic gastrostomy (PEG) tube placement    SOCIAL HISTORY:  lives with daughter 24 hr care                                    Admitted from:  home    Surrogate - daughter - Monse      FAMILY HISTORY:  No pertinent family history in first degree relatives    Baseline ADLs (prior to admission):  Dependent      Allergies    No Known Allergies    Present Symptoms:     Dyspnea: on face mask   Nausea/Vomiting: No   Anxiety: no   Depression: unable   Fatigue: Yes   Loss of appetite: unable     Pain: none            Character-            Duration-            Effect-            Factors-            Frequency-            Location-            Severity-    Review of Systems: Reviewed                   Unable to obtain due to poor mentation   All others negative    MEDICATIONS  (STANDING):  calcium gluconate IVPB 1 Gram(s) IV Intermittent daily  chlorhexidine 0.12% Liquid 15 milliLiter(s) Swish and Spit two times a day  dexmedetomidine Infusion 0.5 MICROgram(s)/kG/Hr (9.075 mL/Hr) IV Continuous <Continuous>  levothyroxine Injectable 56 MICROGram(s) IV Push at bedtime  pantoprazole Infusion 8 mG/Hr (10 mL/Hr) IV Continuous <Continuous>    PHYSICAL EXAM:    Vital Signs Last 24 Hrs  T(C): 37.9 (2018 12:00), Max: 37.9 (2018 12:00)  T(F): 100.2 (2018 12:00), Max: 100.2 (2018 12:00)  HR: 118 (2018 12:00) (78 - 130)  BP: 87/54 (2018 12:00) (60/30 - 190/91)  BP(mean): 67 (2018 12:00) (44 - 131)  RR: 14 (2018 12:00) (14 - 30)  SpO2: 99% (2018 12:00) (91% - 100%)    General: lethargic     Karnofsky:  30 %    HEENT: dry mouth      Lungs: mild tachypnea    CV: normal      GI: normal  PEg    : thomson    MSK: weakness     Skin: no rash    LABS:                       11.3   14.0  )-----------( 235      ( 2018 04:41 )             33.5         145  |  111<H>  |  52.0<H>  ----------------------------<  129<H>  5.8<H>   |  22.0  |  0.67    Ca    8.6      2018 08:07  Phos  3.3       Mg     1.8         TPro  5.5<L>  /  Alb  3.2<L>  /  TBili  1.3  /  DBili  x   /  AST  20  /  ALT  20  /  AlkPhos  78      PT/INR - ( 2018 23:57 )   PT: 12.6 sec;   INR: 1.14 ratio       PTT - ( 2018 23:57 )  PTT:26.3 sec  Urinalysis Basic - ( 2018 23:59 )    Color: Pale Yellow / Appearance: Slightly Turbid / S.020 / pH: x  Gluc: x / Ketone: Negative  / Bili: Negative / Urobili: Negative mg/dL   Blood: x / Protein: 15 mg/dL / Nitrite: Negative   Leuk Esterase: Moderate / RBC: 3-5 /HPF / WBC >50   Sq Epi: x / Non Sq Epi: Occasional / Bacteria: Few    I&O's Summary    2018 07:  -  2018 07:00  --------------------------------------------------------  IN: 3156.2 mL / OUT: 640 mL / NET: 2516.2 mL    2018 07:  -  2018 12:53  --------------------------------------------------------  IN: 155.4 mL / OUT: 305 mL / NET: -149.6 mL    RADIOLOGY & ADDITIONAL STUDIES:  < from: US Duplex Venous Lower Ext Complete, Bilateral (18 @ 11:38) >    No evidence of bilateral lower extremity deep venous thrombosis.    < from: Xray Chest 1 View-PORTABLE IMMEDIATE (18 @ 23:40) >  Impression: No acute pulmonary disease. Lungs are clear and unchanged.    < from: Xray Chest 1 View- PORTABLE-Urgent (18 @ 19:50) >  NG tube tip in the right main bronchus. Repositioning recommended.    ADVANCE DIRECTIVES: Full Code

## 2018-02-23 NOTE — CONSULT NOTE ADULT - PROBLEM SELECTOR PROBLEM 1
Hematemesis, presence of nausea not specified
Dementia without behavioral disturbance, unspecified dementia type

## 2018-02-23 NOTE — PROGRESS NOTE ADULT - SUBJECTIVE AND OBJECTIVE BOX
INTERVAL HPI/OVERNIGHT EVENTS:    SUBJECTIVE:  s/p IR guided PEG tube and EGD with clipping and levophed. Continues to be intubated and sedated. No further blood transfusion required. Pressors have been weaned off.    MEDICATIONS  (STANDING):  chlorhexidine 0.12% Liquid 15 milliLiter(s) Swish and Spit two times a day  dexmedetomidine Infusion 0.5 MICROgram(s)/kG/Hr (9.075 mL/Hr) IV Continuous <Continuous>  levothyroxine Injectable 56 MICROGram(s) IV Push at bedtime  pantoprazole Infusion 8 mG/Hr (10 mL/Hr) IV Continuous <Continuous>    MEDICATIONS  (PRN):      Vital Signs Last 24 Hrs  T(C): 37.6 (2018 08:00), Max: 37.7 (2018 04:00)  T(F): 99.7 (2018 08:00), Max: 99.9 (2018 04:00)  HR: 98 (2018 08:40) (74 - 130)  BP: 105/55 (2018 08:00) (60/30 - 190/91)  BP(mean): 74 (2018 08:00) (44 - 131)  RR: 17 (2018 08:00) (14 - 30)  SpO2: 100% (2018 08:40) (91% - 100%)    PE  Gen: Intubated and sedated   Pulm: Mechanical breath sounds  CV: RRR  Abd: Soft, ND. Midline dressing has mild saturation.  Vasc: Cap refill <2s        I&O's Detail    2018 07:01  -  2018 07:00  --------------------------------------------------------  IN:    dexmedetomidine Infusion: 21.6 mL    norepinephrine Infusion: 10 mL    Packed Red Blood Cells: 629 mL    pantoprazole Infusion: 90 mL    propofol Infusion: 30.6 mL    sodium chloride 0.9%: 1125 mL    Solution: 50 mL    Solution: 200 mL    Solution: 1000 mL  Total IN: 3156.2 mL    OUT:    Indwelling Catheter - Urethral: 640 mL  Total OUT: 640 mL    Total NET: 2516.2 mL          LABS:                        11.3   14.0  )-----------( 235      ( 2018 04:41 )             33.5         145  |  111<H>  |  52.0<H>  ----------------------------<  129<H>  5.8<H>   |  22.0  |  0.67    Ca    8.6      2018 08:07  Phos  3.3       Mg     1.8         TPro  5.5<L>  /  Alb  3.2<L>  /  TBili  1.3  /  DBili  x   /  AST  20  /  ALT  20  /  AlkPhos  78      PT/INR - ( 2018 23:57 )   PT: 12.6 sec;   INR: 1.14 ratio         PTT - ( 2018 23:57 )  PTT:26.3 sec  Urinalysis Basic - ( 2018 23:59 )    Color: Pale Yellow / Appearance: Slightly Turbid / S.020 / pH: x  Gluc: x / Ketone: Negative  / Bili: Negative / Urobili: Negative mg/dL   Blood: x / Protein: 15 mg/dL / Nitrite: Negative   Leuk Esterase: Moderate / RBC: 3-5 /HPF / WBC >50   Sq Epi: x / Non Sq Epi: Occasional / Bacteria: Few        RADIOLOGY & ADDITIONAL STUDIES:

## 2018-02-23 NOTE — CONSULT NOTE ADULT - PROBLEM SELECTOR RECOMMENDATION 4
-Dr. Walker has just spoken to daughter by phone. She won't be coming in until later tonight. Dr. Walker spoke to her about goals already, brought up directives, daughter will be thinking about it and will have to have further discussions as clinical course progresses.

## 2018-02-23 NOTE — CONSULT NOTE ADULT - ASSESSMENT
87F with dementia, brought to ICU for hemorrhagic shock, respiratory failure, bleeding from PEG site s/p clipping, also now s/p extubation, clinically improving.

## 2018-02-23 NOTE — PROGRESS NOTE ADULT - SUBJECTIVE AND OBJECTIVE BOX
Patient is a 87y old  Female who presents with a chief complaint of PEG tube is clogged (2018 00:47)      BRIEF HOSPITAL COURSE: ***    Events last 24 hours: ***    PAST MEDICAL & SURGICAL HISTORY:  Compression fracture of thoracic vertebra: T12  Hypothyroidism  HLD (hyperlipidemia)  HTN (hypertension)  Dementia  History of hip surgery: B/L  S/P percutaneous endoscopic gastrostomy (PEG) tube placement      Review of Systems:  CONSTITUTIONAL: No fever, chills, or fatigue  EYES: No eye pain, visual disturbances, or discharge  ENMT:  No difficulty hearing, tinnitus, vertigo; No sinus or throat pain  NECK: No pain or stiffness  RESPIRATORY: No cough, wheezing, chills or hemoptysis; No shortness of breath  CARDIOVASCULAR: No chest pain, palpitations, dizziness, or leg swelling  GASTROINTESTINAL: No abdominal or epigastric pain. No nausea, vomiting, or hematemesis; No diarrhea or constipation. No melena or hematochezia.  GENITOURINARY: No dysuria, frequency, hematuria, or incontinence  NEUROLOGICAL: No headaches, memory loss, loss of strength, numbness, or tremors  SKIN: No itching, burning, rashes, or lesions   MUSCULOSKELETAL: No joint pain or swelling; No muscle, back, or extremity pain  PSYCHIATRIC: No depression, anxiety, mood swings, or difficulty sleeping      Medications:        dexmedetomidine Infusion 0.5 MICROgram(s)/kG/Hr IV Continuous <Continuous>        pantoprazole Infusion 8 mG/Hr IV Continuous <Continuous>      dextrose 50% Injectable 50 milliLiter(s) IV Push once  insulin regular  human recombinant. 10 Unit(s) SubCutaneous once  levothyroxine Injectable 56 MICROGram(s) IV Push at bedtime    calcium gluconate IVPB 1 Gram(s) IV Intermittent daily      chlorhexidine 0.12% Liquid 15 milliLiter(s) Swish and Spit two times a day        Mode: CPAP with PS  FiO2: 30  PEEP: 5  PS: 10      ICU Vital Signs Last 24 Hrs  T(C): 37.4 (2018 09:00), Max: 37.7 (2018 04:00)  T(F): 99.3 (2018 09:00), Max: 99.9 (2018 04:00)  HR: 98 (2018 09:00) (74 - 130)  BP: 100/58 (2018 09:00) (60/30 - 190/91)  BP(mean): 76 (2018 09:00) (44 - 131)  ABP: --  ABP(mean): --  RR: 18 (:00) (14 - 30)  SpO2: 100% (:00) (91% - 100%)      ABG - ( 2018 23:56 )  pH: 7.38  /  pCO2: 35    /  pO2: 150   / HCO3: 22    / Base Excess: -3.5  /  SaO2: 100                 I&O's Detail    2018 07:01  -  2018 07:00  --------------------------------------------------------  IN:    dexmedetomidine Infusion: 21.6 mL    norepinephrine Infusion: 10 mL    Packed Red Blood Cells: 629 mL    pantoprazole Infusion: 90 mL    propofol Infusion: 30.6 mL    sodium chloride 0.9%: 1125 mL    Solution: 50 mL    Solution: 200 mL    Solution: 1000 mL  Total IN: 3156.2 mL    OUT:    Indwelling Catheter - Urethral: 640 mL  Total OUT: 640 mL    Total NET: 2516.2 mL      2018 07:01  -  2018 09:53  --------------------------------------------------------  IN:    dexmedetomidine Infusion: 5.4 mL    pantoprazole Infusion: 20 mL  Total IN: 25.4 mL    OUT:    Indwelling Catheter - Urethral: 95 mL  Total OUT: 95 mL    Total NET: -69.6 mL            LABS:                        11.3   14.0  )-----------( 235      ( 2018 04:41 )             33.5     02-    145  |  111<H>  |  52.0<H>  ----------------------------<  129<H>  5.8<H>   |  22.0  |  0.67    Ca    8.6      2018 08:07  Phos  3.3     -  Mg     1.8     -    TPro  5.5<L>  /  Alb  3.2<L>  /  TBili  1.3  /  DBili  x   /  AST  20  /  ALT  20  /  AlkPhos  78  02-      CARDIAC MARKERS ( 2018 19:22 )  x     / <0.01 ng/mL / x     / x     / x      CARDIAC MARKERS ( 2018 21:17 )  x     / 0.01 ng/mL / x     / x     / x          CAPILLARY BLOOD GLUCOSE        PT/INR - ( 2018 23:57 )   PT: 12.6 sec;   INR: 1.14 ratio         PTT - ( 2018 23:57 )  PTT:26.3 sec  Urinalysis Basic - ( 2018 23:59 )    Color: Pale Yellow / Appearance: Slightly Turbid / S.020 / pH: x  Gluc: x / Ketone: Negative  / Bili: Negative / Urobili: Negative mg/dL   Blood: x / Protein: 15 mg/dL / Nitrite: Negative   Leuk Esterase: Moderate / RBC: 3-5 /HPF / WBC >50   Sq Epi: x / Non Sq Epi: Occasional / Bacteria: Few      CULTURES:      Physical Examination:    General: No acute distress.  Alert, oriented, interactive, nonfocal    HEENT: Pupils equal, reactive to light.  Symmetric.    PULM: Clear to auscultation bilaterally, no significant sputum production    CVS: Regular rate and rhythm, no murmurs, rubs, or gallops    ABD: Soft, nondistended, nontender, normoactive bowel sounds, no masses    EXT: No edema, nontender    SKIN: Warm and well perfused, no rashes noted.    RADIOLOGY: ***    CRITICAL CARE TIME SPENT: *** Patient is a 87y old  Female who presents with a chief complaint of PEG tube is clogged (2018 00:47)      BRIEF HOSPITAL COURSE: Peg tube malfunction which required re-insertion complicated by hemorrhagic shock, acute respiratory failure    Events last 24 hours: weaning to extubation, off vasopressors, no further bleeding    PAST MEDICAL & SURGICAL HISTORY:  Compression fracture of thoracic vertebra: T12  Hypothyroidism  HLD (hyperlipidemia)  HTN (hypertension)  Dementia  History of hip surgery: B/L  S/P percutaneous endoscopic gastrostomy (PEG) tube placement      Review of Systems:  unable to obtain    Medications  dexmedetomidine Infusion 0.5 MICROgram(s)/kG/Hr IV Continuous <Continuous>  pantoprazole Infusion 8 mG/Hr IV Continuous <Continuous>  dextrose 50% Injectable 50 milliLiter(s) IV Push once  insulin regular  human recombinant. 10 Unit(s) SubCutaneous once  levothyroxine Injectable 56 MICROGram(s) IV Push at bedtime  calcium gluconate IVPB 1 Gram(s) IV Intermittent daily  chlorhexidine 0.12% Liquid 15 milliLiter(s) Swish and Spit two times a day        Mode: CPAP with PS  FiO2: 30  PEEP: 5  PS: 10      ICU Vital Signs Last 24 Hrs  T(C): 37.4 (2018 09:00), Max: 37.7 (2018 04:00)  T(F): 99.3 (2018 09:00), Max: 99.9 (2018 04:00)  HR: 98 (2018 09:00) (74 - 130)  BP: 100/58 (2018 09:00) (60/30 - 190/91)  BP(mean): 76 (2018 09:00) (44 - 131)  ABP: --  ABP(mean): --  RR: 18 (2018 09:00) (14 - 30)  SpO2: 100% (2018 09:00) (91% - 100%)      ABG - ( 2018 23:56 )  pH: 7.38  /  pCO2: 35    /  pO2: 150   / HCO3: 22    / Base Excess: -3.5  /  SaO2: 100                 I&O's Detail    2018 07:01  -  2018 07:00  --------------------------------------------------------  IN:    dexmedetomidine Infusion: 21.6 mL    norepinephrine Infusion: 10 mL    Packed Red Blood Cells: 629 mL    pantoprazole Infusion: 90 mL    propofol Infusion: 30.6 mL    sodium chloride 0.9%: 1125 mL    Solution: 50 mL    Solution: 200 mL    Solution: 1000 mL  Total IN: 3156.2 mL    OUT:    Indwelling Catheter - Urethral: 640 mL  Total OUT: 640 mL    Total NET: 2516.2 mL      2018 07:01  -  2018 09:53  --------------------------------------------------------  IN:    dexmedetomidine Infusion: 5.4 mL    pantoprazole Infusion: 20 mL  Total IN: 25.4 mL    OUT:    Indwelling Catheter - Urethral: 95 mL  Total OUT: 95 mL    Total NET: -69.6 mL            LABS:                        11.3   14.0  )-----------( 235      ( 2018 04:41 )             33.5     02    145  |  111<H>  |  52.0<H>  ----------------------------<  129<H>  5.8<H>   |  22.0  |  0.67    Ca    8.6      2018 08:07  Phos  3.3       Mg     1.8         TPro  5.5<L>  /  Alb  3.2<L>  /  TBili  1.3  /  DBili  x   /  AST  20  /  ALT  20  /  AlkPhos  78  02      CARDIAC MARKERS ( 2018 19:22 )  x     / <0.01 ng/mL / x     / x     / x      CARDIAC MARKERS ( 2018 21:17 )  x     / 0.01 ng/mL / x     / x     / x          CAPILLARY BLOOD GLUCOSE        PT/INR - ( 2018 23:57 )   PT: 12.6 sec;   INR: 1.14 ratio         PTT - ( 2018 23:57 )  PTT:26.3 sec  Urinalysis Basic - ( 2018 23:59 )    Color: Pale Yellow / Appearance: Slightly Turbid / S.020 / pH: x  Gluc: x / Ketone: Negative  / Bili: Negative / Urobili: Negative mg/dL   Blood: x / Protein: 15 mg/dL / Nitrite: Negative   Leuk Esterase: Moderate / RBC: 3-5 /HPF / WBC >50   Sq Epi: x / Non Sq Epi: Occasional / Bacteria: Few      CULTURES:      Physical Examination:    General: No acute distress.  following commands    HEENT: Pupils equal, reactive to light.  Symmetric.    PULM: Clear to auscultation bilaterally, no significant sputum production    CVS: Regular rate and rhythm, no murmurs, rubs, or gallops    ABD: Soft, nondistended, nontender, normoactive bowel sounds, no masses, peg tube in place    EXT: No edema, nontender    SKIN: Warm and well perfused, no rashes noted.

## 2018-02-23 NOTE — CONSULT NOTE ADULT - ATTENDING COMMENTS
active gastric bleeding after placement of IR PEG. NATASHA UNDERWOOD, GI at bedside performed egd, found arterial bleeding at peg site, epi and 2x clips , bleeding was controlled.   keep peg to gravity for 48 hours prior to start feeding. considering the approximate of bleeding site.
Thank you for the opportunity to assist with the care of this patient.   [facility] [phone].

## 2018-02-23 NOTE — CONSULT NOTE ADULT - PROBLEM SELECTOR RECOMMENDATION 9
- baseline per ICU discussion with daughter, that mom is a full assist with all ADLs, also per daughter, she was able to feed her by mouth ( even with the PEG) and mom had no issues.

## 2018-02-23 NOTE — PATIENT PROFILE ADULT. - ABILITY TO HEAR (WITH HEARING AID OR HEARING APPLIANCE IF NORMALLY USED):
Mildly to Moderately Impaired: difficulty hearing in some environments or speaker may need to increase volume or speak distinctly/wears hearing aids

## 2018-02-23 NOTE — PROGRESS NOTE ADULT - SUBJECTIVE AND OBJECTIVE BOX
Pt seen and examined. She is s/p an emergent EGD last night with epinephrine injection and clipping of an arterial bleed at the gastrostomy tube gastric insertion site underneath the balloon of the newly placed gastrostomy tube yesterday. She has had no active GI bleeding since that EGD and she has had good hemostasis post EGD. She is presently awake but remains intubated and is in the process of being weaned off of the ventilator. NGT which was removed during the EGD was not re-inserted post EGD for fear of knocking off the gastric hemoclips. She is presently NPO and had a large melenotic BM last night w/o recurrent hematemesis. Hb this AM roughly 11 grams after transfusion of 4 units of PRBC's. She received two units of FFP and K-Centra and platelet infusions pre EGD last night    MEDICATIONS:  MEDICATIONS  (STANDING):  chlorhexidine 0.12% Liquid 15 milliLiter(s) Swish and Spit two times a day  dexmedetomidine Infusion 0.5 MICROgram(s)/kG/Hr (9.075 mL/Hr) IV Continuous <Continuous>  levothyroxine Injectable 56 MICROGram(s) IV Push at bedtime  pantoprazole Infusion 8 mG/Hr (10 mL/Hr) IV Continuous <Continuous>    MEDICATIONS  (PRN):      Allergies    No Known Allergies    Intolerances        Vital Signs Last 24 Hrs  T(C): 37.7 (2018 04:00), Max: 37.7 (2018 04:00)  T(F): 99.9 (2018 04:00), Max: 99.9 (2018 04:00)  HR: 120 (2018 06:00) (74 - 130)  BP: 114/59 (2018 06:00) (60/30 - 190/91)  BP(mean): 81 (2018 06:00) (44 - 131)  RR: 18 (2018 06:00) (14 - 30)  SpO2: 100% (2018 06:00) (91% - 100%)     @ 07:01  -   @ 07:00  --------------------------------------------------------  IN: 125 mL / OUT: 0 mL / NET: 125 mL     @ 07:01  -   @ 06:44  --------------------------------------------------------  IN: 2156.2 mL / OUT: 615 mL / NET: 1541.2 mL        PHYSICAL EXAM:    General: Well developed; well nourished; in no acute distress, remains intubated.  HEENT: MMM, conjunctiva pink and sclera anicteric.  Lungs: clear to auscultation bilaterally.  Cor: RRR S1, S2 only.  Gastrointestinal: Abdomen: Soft non-tender non-distended; Normal bowel sounds; No hepatosplenomegaly  Gastrostomy tube site examined, clean and dry w/o ecchymoses.  Extremities: no cyanosis, clubbing or edema.  Skin: Warm and dry. No obvious rash  Neuro: Pt. is alert and intubated with sedation held in the hopes of extubating patient later today.    LABS:  ABG - ( 2018 23:56 )  pH: 7.38  /  pCO2: 35    /  pO2: 150   / HCO3: 22    / Base Excess: -3.5  /  SaO2: 100                 CBC Full  -  ( 2018 04:41 )  WBC Count : 14.0 K/uL  Hemoglobin : 11.3 g/dL  Hematocrit : 33.5 %  Platelet Count - Automated : 235 K/uL  Mean Cell Volume : 87.2 fl  Mean Cell Hemoglobin : 29.4 pg  Mean Cell Hemoglobin Concentration : 33.7 g/dL  Auto Neutrophil # : x  Auto Lymphocyte # : x  Auto Monocyte # : x  Auto Eosinophil # : x  Auto Basophil # : x  Auto Neutrophil % : x  Auto Lymphocyte % : x  Auto Monocyte % : x  Auto Eosinophil % : x  Auto Basophil % : x        146<H>  |  111<H>  |  43.0<H>  ----------------------------<  90  5.7<H>   |  22.0  |  0.66    Ca    9.3      2018 04:41  Phos  3.3       Mg     1.8         TPro  5.5<L>  /  Alb  3.2<L>  /  TBili  1.3  /  DBili  x   /  AST  20  /  ALT  20  /  AlkPhos  78  -    PT/INR - ( 2018 23:57 )   PT: 12.6 sec;   INR: 1.14 ratio         PTT - ( 2018 23:57 )  PTT:26.3 sec      Urinalysis Basic - ( 2018 23:59 )    Color: Pale Yellow / Appearance: Slightly Turbid / S.020 / pH: x  Gluc: x / Ketone: Negative  / Bili: Negative / Urobili: Negative mg/dL   Blood: x / Protein: 15 mg/dL / Nitrite: Negative   Leuk Esterase: Moderate / RBC: 3-5 /HPF / WBC >50   Sq Epi: x / Non Sq Epi: Occasional / Bacteria: Few                RADIOLOGY & ADDITIONAL STUDIES (The following images were personally reviewed):

## 2018-02-24 LAB
ANION GAP SERPL CALC-SCNC: 9 MMOL/L — SIGNIFICANT CHANGE UP (ref 5–17)
BUN SERPL-MCNC: 56 MG/DL — HIGH (ref 8–20)
CALCIUM SERPL-MCNC: 9.6 MG/DL — SIGNIFICANT CHANGE UP (ref 8.6–10.2)
CHLORIDE SERPL-SCNC: 115 MMOL/L — HIGH (ref 98–107)
CO2 SERPL-SCNC: 25 MMOL/L — SIGNIFICANT CHANGE UP (ref 22–29)
CREAT SERPL-MCNC: 0.57 MG/DL — SIGNIFICANT CHANGE UP (ref 0.5–1.3)
GLUCOSE SERPL-MCNC: 105 MG/DL — SIGNIFICANT CHANGE UP (ref 70–115)
HCT VFR BLD CALC: 30.4 % — LOW (ref 37–47)
HGB BLD-MCNC: 10.1 G/DL — LOW (ref 12–16)
MAGNESIUM SERPL-MCNC: 2.3 MG/DL — SIGNIFICANT CHANGE UP (ref 1.6–2.6)
MCHC RBC-ENTMCNC: 29.3 PG — SIGNIFICANT CHANGE UP (ref 27–31)
MCHC RBC-ENTMCNC: 33.2 G/DL — SIGNIFICANT CHANGE UP (ref 32–36)
MCV RBC AUTO: 88.1 FL — SIGNIFICANT CHANGE UP (ref 81–99)
PHOSPHATE SERPL-MCNC: 2.4 MG/DL — SIGNIFICANT CHANGE UP (ref 2.4–4.7)
PLATELET # BLD AUTO: 182 K/UL — SIGNIFICANT CHANGE UP (ref 150–400)
POTASSIUM SERPL-MCNC: 4 MMOL/L — SIGNIFICANT CHANGE UP (ref 3.5–5.3)
POTASSIUM SERPL-SCNC: 4 MMOL/L — SIGNIFICANT CHANGE UP (ref 3.5–5.3)
RBC # BLD: 3.45 M/UL — LOW (ref 4.4–5.2)
RBC # FLD: 16.1 % — HIGH (ref 11–15.6)
SODIUM SERPL-SCNC: 149 MMOL/L — HIGH (ref 135–145)
WBC # BLD: 9.1 K/UL — SIGNIFICANT CHANGE UP (ref 4.8–10.8)
WBC # FLD AUTO: 9.1 K/UL — SIGNIFICANT CHANGE UP (ref 4.8–10.8)

## 2018-02-24 PROCEDURE — 99233 SBSQ HOSP IP/OBS HIGH 50: CPT

## 2018-02-24 RX ORDER — LEVOTHYROXINE SODIUM 125 MCG
1 TABLET ORAL
Qty: 0 | Refills: 0 | COMMUNITY

## 2018-02-24 RX ORDER — NYSTATIN CREAM 100000 [USP'U]/G
1 CREAM TOPICAL
Qty: 0 | Refills: 0 | COMMUNITY

## 2018-02-24 RX ORDER — SODIUM CHLORIDE 9 MG/ML
1000 INJECTION, SOLUTION INTRAVENOUS
Qty: 0 | Refills: 0 | Status: DISCONTINUED | OUTPATIENT
Start: 2018-02-24 | End: 2018-02-26

## 2018-02-24 RX ORDER — LEVOTHYROXINE SODIUM 125 MCG
112 TABLET ORAL DAILY
Qty: 0 | Refills: 0 | Status: DISCONTINUED | OUTPATIENT
Start: 2018-02-24 | End: 2018-03-01

## 2018-02-24 RX ORDER — PANTOPRAZOLE SODIUM 20 MG/1
40 TABLET, DELAYED RELEASE ORAL EVERY 12 HOURS
Qty: 0 | Refills: 0 | Status: DISCONTINUED | OUTPATIENT
Start: 2018-02-24 | End: 2018-02-28

## 2018-02-24 RX ADMIN — CHLORHEXIDINE GLUCONATE 15 MILLILITER(S): 213 SOLUTION TOPICAL at 06:36

## 2018-02-24 RX ADMIN — PANTOPRAZOLE SODIUM 40 MILLIGRAM(S): 20 TABLET, DELAYED RELEASE ORAL at 17:34

## 2018-02-24 RX ADMIN — Medication 200 GRAM(S): at 12:35

## 2018-02-24 RX ADMIN — SODIUM CHLORIDE 80 MILLILITER(S): 9 INJECTION, SOLUTION INTRAVENOUS at 18:46

## 2018-02-24 RX ADMIN — PANTOPRAZOLE SODIUM 10 MG/HR: 20 TABLET, DELAYED RELEASE ORAL at 01:53

## 2018-02-24 NOTE — DIETITIAN INITIAL EVALUATION ADULT. - FACTORS AFF FOOD INTAKE
difficulty chewing/change in mental status/difficulty feeding self/difficulty swallowing/Recurrent aspiration, dysphagia

## 2018-02-24 NOTE — PROGRESS NOTE ADULT - SUBJECTIVE AND OBJECTIVE BOX
Patient is a 87y old  Female who presents with a chief complaint of PEG tube is clogged (22 Feb 2018 00:47)      BRIEF HOSPITAL COURSE: Peg tube malfunction which required re-insertion complicated by hemorrhagic shock, acute respiratory failure.  Pt was treated by GI with clips and cautery to gastric bleeding.  She has been hemodynamically stable and was extubated.    Events last 24 hours: Pt awake follows some commands, oriented to person, hospital.     PAST MEDICAL & SURGICAL HISTORY:  Compression fracture of thoracic vertebra: T12  Hypothyroidism  HLD (hyperlipidemia)  MEDICATIONS  (STANDING):  calcium gluconate IVPB 1 Gram(s) IV Intermittent daily  levothyroxine 112 MICROGram(s) Oral daily  pantoprazole  Injectable 40 milliGRAM(s) IV Push every 12 hours  HTN (hypertension)  Dementia  History of hip surgery: B/L  S/P percutaneous endoscopic gastrostomy (PEG) tube placement    MEDICATIONS  (STANDING):  calcium gluconate IVPB 1 Gram(s) IV Intermittent daily  levothyroxine 112 MICROGram(s) Oral daily  pantoprazole  Injectable 40 milliGRAM(s) IV Push every 12 hours                 10.1   9.1    )----------(  182       ( 24 Feb 2018 05:32 )               30.4      149    |  115    |  56.0   ----------------------------<  105        ( 24 Feb 2018 05:32 )  4.0     |  25.0   |  0.57     Ca    9.6        ( 24 Feb 2018 05:32 )  Phos  2.4       ( 24 Feb 2018 05:32 )  Mg     2.3       ( 24 Feb 2018 05:32 )      ICU Vital Signs Last 24 Hrs  T(C): 37 (24 Feb 2018 11:39), Max: 37.6 (23 Feb 2018 16:04)  T(F): 98.6 (24 Feb 2018 11:39), Max: 99.7 (23 Feb 2018 16:04)  HR: 106 (24 Feb 2018 12:00) (82 - 126)  BP: 107/62 (24 Feb 2018 12:00) (102/55 - 175/69)  BP(mean): 79 (24 Feb 2018 12:00) (77 - 101)  ABP: --  ABP(mean): --  RR: 19 (24 Feb 2018 12:00) (11 - 30)  SpO2: 99% (24 Feb 2018 12:00) (94% - 10  Review of Systems:  unable to obtain      Physical Examination:    General: No acute distress.  following commands    HEENT: Pupils equal, reactive to light.  Symmetric.    PULM: Clear to auscultation bilaterally, no significant sputum production    CVS: Regular rate and rhythm, no murmurs, rubs, or gallops    ABD: Soft, nondistended, nontender, normoactive bowel sounds, no masses, peg tube in place    EXT: No edema, nontender    SKIN: Warm and well perfused, no rashes noted.

## 2018-02-24 NOTE — PROGRESS NOTE ADULT - SUBJECTIVE AND OBJECTIVE BOX
CC: Peg tube replacement following previous blocked peg tube.  Today some leak from around the tube during feeding.      HPI:  History was taken from charts and ED Physician as no family member is available at this time. Called patient's daughter - Monse Newsome but couldn't get in touch. Patient has dementia and is unable to provide any information.   87 years old female brought by EMS with clogged PEG Tube. Patient had PEG placed 2 weeks ago at [facility] due to dysphagia and recurrent aspiration. It was not working since yesterday and patient was taken to [facility] where it was flushed and later patient was discharged home. But it got clogged again so patient was brought to [facility]. Family refused to take her back to [facility].  No fever, abdominal pain, nausea or vomiting. (2018 00:47)    REVIEW OF SYSTEMS:    Patient denied fever, chills, abdominal pain, nausea, vomiting, cough, shortness of breath, chest pain or palpitations    Vital Signs Last 24 Hrs  T(C): 37 (2018 11:39), Max: 37.1 (2018 19:00)  T(F): 98.6 (2018 11:39), Max: 98.8 (2018 23:00)  HR: 104 (2018 16:00) (82 - 120)  BP: 129/63 (2018 16:00) (102/55 - 175/69)  BP(mean): 90 (2018 16:00) (77 - 101)  RR: 21 (2018 16:00) (11 - 30)  SpO2: 97% (2018 16:00) (94% - 100%)I&O's Summary    2018 07:  -  2018 07:00  --------------------------------------------------------  IN: 445.4 mL / OUT: 305 mL / NET: 140.4 mL    2018 07:01  -  2018 16:52  --------------------------------------------------------  IN: 170 mL / OUT: 0 mL / NET: 170 mL      PHYSICAL EXAM:  GENERAL: NAD  HEENT: PERRL, +EOMI, anicteric, no [place]  NECK: Supple, No JVD   CHEST/LUNG: bilateral rales   HEART: S1S2 Normal intensity, no murmurs, gallops or rubs noted  ABDOMEN: Soft, BS Normoactive, NT, ND, no HSM noted  EXTREMITIES:  2+ radial and DP pulses noted, no clubbing, cyanosis, or edema noted. Limited mobility   SKIN: No rashes or lesions noted  NEURO: Lethargy, no focal deficits noted, Hoarse voice CN II-XII intact  PSYCH: Depressed  mood and affect; insight/judgement appropriate  LABS:                        10.1   9.1   )-----------( 182      ( 2018 05:32 )             30.4     -    149<H>  |  115<H>  |  56.0<H>  ----------------------------<  105  4.0   |  25.0  |  0.57    Ca    9.6      2018 05:32  Phos  2.4       Mg     2.3         TPro  5.5<L>  /  Alb  3.2<L>  /  TBili  1.3  /  DBili  x   /  AST  20  /  ALT  20  /  AlkPhos  78      PT/INR - ( 2018 23:57 )   PT: 12.6 sec;   INR: 1.14 ratio         PTT - ( 2018 23:57 )  PTT:26.3 sec  Urinalysis Basic - ( 2018 23:59 )    Color: Pale Yellow / Appearance: Slightly Turbid / S.020 / pH: x  Gluc: x / Ketone: Negative  / Bili: Negative / Urobili: Negative mg/dL   Blood: x / Protein: 15 mg/dL / Nitrite: Negative   Leuk Esterase: Moderate / RBC: 3-5 /HPF / WBC >50   Sq Epi: x / Non Sq Epi: Occasional / Bacteria: Few      RADIOLOGY & ADDITIONAL TESTS:    MEDICATIONS:  MEDICATIONS  (STANDING):  calcium gluconate IVPB 1 Gram(s) IV Intermittent daily  levothyroxine 112 MICROGram(s) Oral daily  pantoprazole  Injectable 40 milliGRAM(s) IV Push every 12 hours    MEDICATIONS  (PRN):

## 2018-02-24 NOTE — PROGRESS NOTE ADULT - SUBJECTIVE AND OBJECTIVE BOX
\\    INTERVAL HPI/OVERNIGHT EVENTS/SUBJECTIVE: s/p PEG replacement with post procedural bleed s/p EGD  with clipping. H/H remains stable without further bleed.      ICU Vital Signs Last 24 Hrs  T(C): 36.2 (2018 04:00), Max: 37.9 (2018 12:00)  T(F): 97.2 (2018 04:00), Max: 100.2 (2018 12:00)  HR: 98 (2018 08:00) (82 - 126)  BP: 137/64 (2018 08:00) (87/54 - 175/69)  BP(mean): 94 (2018 08:00) (67 - 99)  ABP: --  ABP(mean): --  RR: 20 (2018 08:00) (11 - 30)  SpO2: 99% (2018 08:00) (94% - 100%)      I&O's Detail    2018 07:01  -  2018 07:00  --------------------------------------------------------  IN:    dexmedetomidine Infusion: 5.4 mL    pantoprazole Infusion: 240 mL    Solution: 100 mL    Solution: 100 mL  Total IN: 445.4 mL    OUT:    Indwelling Catheter - Urethral: 305 mL  Total OUT: 305 mL    Total NET: 140.4 mL      2018 07:01  -  2018 09:14  --------------------------------------------------------  IN:    pantoprazole Infusion: 10 mL  Total IN: 10 mL    OUT:  Total OUT: 0 mL    Total NET: 10 mL          Mode: CPAP with PS  FiO2: 30  PEEP: 5  PS: 10  MAP: 8    ABG - ( 2018 23:56 )  pH: 7.38  /  pCO2: 35    /  pO2: 150   / HCO3: 22    / Base Excess: -3.5  /  SaO2: 100                 MEDICATIONS  (STANDING):  calcium gluconate IVPB 1 Gram(s) IV Intermittent daily  chlorhexidine 0.12% Liquid 15 milliLiter(s) Swish and Spit two times a day  dexmedetomidine Infusion 0.5 MICROgram(s)/kG/Hr (9.075 mL/Hr) IV Continuous <Continuous>  levothyroxine Injectable 56 MICROGram(s) IV Push at bedtime  pantoprazole Infusion 8 mG/Hr (10 mL/Hr) IV Continuous <Continuous>    MEDICATIONS  (PRN):      NUTRITION/IVF:  NPO      LABS:  CBC Full  -  ( 2018 05:32 )  WBC Count : 9.1 K/uL  Hemoglobin : 10.1 g/dL  Hematocrit : 30.4 %  Platelet Count - Automated : 182 K/uL  Mean Cell Volume : 88.1 fl  Mean Cell Hemoglobin : 29.3 pg  Mean Cell Hemoglobin Concentration : 33.2 g/dL  Auto Neutrophil # : x  Auto Lymphocyte # : x  Auto Monocyte # : x  Auto Eosinophil # : x  Auto Basophil # : x  Auto Neutrophil % : x  Auto Lymphocyte % : x  Auto Monocyte % : x  Auto Eosinophil % : x  Auto Basophil % : x    02-24    149<H>  |  115<H>  |  56.0<H>  ----------------------------<  105  4.0   |  25.0  |  0.57    Ca    9.6      2018 05:32  Phos  2.4     02-  Mg     2.3     02-24    TPro  5.5<L>  /  Alb  3.2<L>  /  TBili  1.3  /  DBili  x   /  AST  20  /  ALT  20  /  AlkPhos  78  02-    PT/INR - ( 2018 23:57 )   PT: 12.6 sec;   INR: 1.14 ratio         PTT - ( 2018 23:57 )  PTT:26.3 sec  Urinalysis Basic - ( 2018 23:59 )    Color: Pale Yellow / Appearance: Slightly Turbid / S.020 / pH: x  Gluc: x / Ketone: Negative  / Bili: Negative / Urobili: Negative mg/dL   Blood: x / Protein: 15 mg/dL / Nitrite: Negative   Leuk Esterase: Moderate / RBC: 3-5 /HPF / WBC >50   Sq Epi: x / Non Sq Epi: Occasional / Bacteria: Few      RECENT CULTURES:      LIVER FUNCTIONS - ( 2018 23:57 )  Alb: 3.2 g/dL / Pro: 5.5 g/dL / ALK PHOS: 78 U/L / ALT: 20 U/L / AST: 20 U/L / GGT: x           CARDIAC MARKERS ( 2018 19:22 )  x     / <0.01 ng/mL / x     / x     / x          CAPILLARY BLOOD GLUCOSE      RADIOLOGY & ADDITIONAL STUDIES:    ASSESSMENT/PLAN:  87yFemale presenting with:  UGIB s/p PEG replacement     H/H remains stable.     Recommend starting tube feeds today.    Will sign off.  Please reconsult as needed.

## 2018-02-24 NOTE — DIETITIAN INITIAL EVALUATION ADULT. - OTHER INFO
Pt admitted for PEG tube clog at home. s/p replacement, s/p EGD with bleed. NPO. Tube feeds to start today. See recommendations below.

## 2018-02-24 NOTE — PROGRESS NOTE ADULT - SUBJECTIVE AND OBJECTIVE BOX
Patient seen and examined;  chart reviewed.  No BM overnight;  Last bloody bm was yesterday.  Clinically stable.  Still NPO.  Last Hgb:  10.1.      PAST MEDICAL & SURGICAL HISTORY:  Compression fracture of thoracic vertebra: T12  Hypothyroidism  HLD (hyperlipidemia)  HTN (hypertension)  Dementia  History of hip surgery: B/L  S/P percutaneous endoscopic gastrostomy (PEG) tube placement      ROS:  No Heartburn, regurgitation, dysphagia, odynophagia.  No dyspepsia  No abdominal pain.    No Nausea, vomiting.  No Bleeding.  No hematemesis.   No diarrhea.    No hematochesia.  No weight loss, anorexia.  No edema.      MEDICATIONS  (STANDING):  calcium gluconate IVPB 1 Gram(s) IV Intermittent daily  levothyroxine 112 MICROGram(s) Oral daily  pantoprazole  Injectable 40 milliGRAM(s) IV Push every 12 hours    MEDICATIONS  (PRN):      Allergies    No Known Allergies    Intolerances        Vital Signs Last 24 Hrs  T(C): 37 (2018 11:39), Max: 37.6 (2018 16:04)  T(F): 98.6 (2018 11:39), Max: 99.7 (2018 16:04)  HR: 91 (2018 14:00) (82 - 120)  BP: 121/59 (2018 14:00) (102/55 - 175/69)  BP(mean): 83 (2018 14:00) (77 - 101)  RR: 26 (2018 14:00) (11 - 30)  SpO2: 96% (2018 14:00) (94% - 100%)    PHYSICAL EXAM:    GENERAL: NAD, well-groomed, well-developed  HEAD:  Atraumatic, Normocephalic  EYES: EOMI, PERRLA, conjunctiva and sclera clear  ENMT: No tonsillar erythema, exudates, or enlargement; Moist mucous membranes, Good dentition, No lesions  NECK: Supple, No JVD, Normal thyroid  CHEST/LUNG: Clear to percussion bilaterally; No rales, rhonchi, wheezing, or rubs  HEART: Regular rate and rhythm; No murmurs, rubs, or gallops  ABDOMEN: Soft, Nontender, Nondistended; Bowel sounds present;  G tube sutured in place.  Site:  clean.  No oozing.    KT:  No lesions.  Black stool.    EXTREMITIES:  2+ Peripheral Pulses, No clubbing, cyanosis, or edema  LYMPH: No lymphadenopathy noted  SKIN: No rashes or lesions      LABS:                        10.1   9.1   )-----------( 182      ( 2018 05:32 )             30.4     02-    149<H>  |  115<H>  |  56.0<H>  ----------------------------<  105  4.0   |  25.0  |  0.57    Ca    9.6      2018 05:32  Phos  2.4     -  Mg     2.3     -    TPro  5.5<L>  /  Alb  3.2<L>  /  TBili  1.3  /  DBili  x   /  AST  20  /  ALT  20  /  AlkPhos  78  -    PT/INR - ( 2018 23:57 )   PT: 12.6 sec;   INR: 1.14 ratio         PTT - ( 2018 23:57 )  PTT:26.3 sec   Urinalysis Basic - ( 2018 23:59 )    Color: Pale Yellow / Appearance: Slightly Turbid / S.020 / pH: x  Gluc: x / Ketone: Negative  / Bili: Negative / Urobili: Negative mg/dL   Blood: x / Protein: 15 mg/dL / Nitrite: Negative   Leuk Esterase: Moderate / RBC: 3-5 /HPF / WBC >50   Sq Epi: x / Non Sq Epi: Occasional / Bacteria: Few          RADIOLOGY & ADDITIONAL STUDIES:

## 2018-02-24 NOTE — DIETITIAN INITIAL EVALUATION ADULT. - NS AS NUTRI INTERV ENTERAL NUTRITION
Rx Jevity 1.5 @ 60ml/hr x24hr (1800kcal, 76gm pro, 1200ml) based on 20hr/Composition/Concentration/Rate

## 2018-02-25 LAB
-  AMIKACIN: SIGNIFICANT CHANGE UP
-  AZTREONAM: SIGNIFICANT CHANGE UP
-  CEFEPIME: SIGNIFICANT CHANGE UP
-  CEFTAZIDIME: SIGNIFICANT CHANGE UP
-  CIPROFLOXACIN: SIGNIFICANT CHANGE UP
-  GENTAMICIN: SIGNIFICANT CHANGE UP
-  IMIPENEM: SIGNIFICANT CHANGE UP
-  LEVOFLOXACIN: SIGNIFICANT CHANGE UP
-  MEROPENEM: SIGNIFICANT CHANGE UP
-  PIPERACILLIN/TAZOBACTAM: SIGNIFICANT CHANGE UP
-  TOBRAMYCIN: SIGNIFICANT CHANGE UP
ANION GAP SERPL CALC-SCNC: 11 MMOL/L — SIGNIFICANT CHANGE UP (ref 5–17)
BUN SERPL-MCNC: 25 MG/DL — HIGH (ref 8–20)
CALCIUM SERPL-MCNC: 9 MG/DL — SIGNIFICANT CHANGE UP (ref 8.6–10.2)
CHLORIDE SERPL-SCNC: 110 MMOL/L — HIGH (ref 98–107)
CO2 SERPL-SCNC: 25 MMOL/L — SIGNIFICANT CHANGE UP (ref 22–29)
CREAT SERPL-MCNC: 0.43 MG/DL — LOW (ref 0.5–1.3)
GLUCOSE SERPL-MCNC: 119 MG/DL — HIGH (ref 70–115)
HCT VFR BLD CALC: 29 % — LOW (ref 37–47)
HGB BLD-MCNC: 9.5 G/DL — LOW (ref 12–16)
MCHC RBC-ENTMCNC: 29.1 PG — SIGNIFICANT CHANGE UP (ref 27–31)
MCHC RBC-ENTMCNC: 32.8 G/DL — SIGNIFICANT CHANGE UP (ref 32–36)
MCV RBC AUTO: 88.7 FL — SIGNIFICANT CHANGE UP (ref 81–99)
METHOD TYPE: SIGNIFICANT CHANGE UP
PLATELET # BLD AUTO: 166 K/UL — SIGNIFICANT CHANGE UP (ref 150–400)
POTASSIUM SERPL-MCNC: 3.2 MMOL/L — LOW (ref 3.5–5.3)
POTASSIUM SERPL-SCNC: 3.2 MMOL/L — LOW (ref 3.5–5.3)
RBC # BLD: 3.27 M/UL — LOW (ref 4.4–5.2)
RBC # FLD: 15.8 % — HIGH (ref 11–15.6)
SODIUM SERPL-SCNC: 146 MMOL/L — HIGH (ref 135–145)
WBC # BLD: 5.8 K/UL — SIGNIFICANT CHANGE UP (ref 4.8–10.8)
WBC # FLD AUTO: 5.8 K/UL — SIGNIFICANT CHANGE UP (ref 4.8–10.8)

## 2018-02-25 PROCEDURE — 99233 SBSQ HOSP IP/OBS HIGH 50: CPT

## 2018-02-25 PROCEDURE — 74176 CT ABD & PELVIS W/O CONTRAST: CPT | Mod: 26

## 2018-02-25 RX ADMIN — PANTOPRAZOLE SODIUM 40 MILLIGRAM(S): 20 TABLET, DELAYED RELEASE ORAL at 05:53

## 2018-02-25 RX ADMIN — PANTOPRAZOLE SODIUM 40 MILLIGRAM(S): 20 TABLET, DELAYED RELEASE ORAL at 18:34

## 2018-02-25 RX ADMIN — Medication 200 GRAM(S): at 11:14

## 2018-02-25 RX ADMIN — SODIUM CHLORIDE 80 MILLILITER(S): 9 INJECTION, SOLUTION INTRAVENOUS at 08:21

## 2018-02-25 NOTE — PROGRESS NOTE ADULT - SUBJECTIVE AND OBJECTIVE BOX
CC: Peg tube replacement. Nursing staff notice leaking around ostium of tube. Use of peg held pending reeval by GI.    HPI:  History was taken from charts and ED Physician as no family member is available at this time. Called patient's daughter - Monse Newsome but couldn't get in touch. Patient has dementia and is unable to provide any information.   87 years old female brought by EMS with clogged PEG Tube. Patient had PEG placed 2 weeks ago at [facility] due to dysphagia and recurrent aspiration. It was not working since yesterday and patient was taken to [facility] where it was flushed and later patient was discharged home. But it got clogged again so patient was brought to [facility]. Family refused to take her back to [facility].  No fever, abdominal pain, nausea or vomiting. (22 Feb 2018 00:47)    REVIEW OF SYSTEMS:    Patient denied fever, chills, abdominal pain, nausea, vomiting, cough, shortness of breath, chest pain or palpitations    Vital Signs Last 24 Hrs  T(C): 36.7 (25 Feb 2018 08:23), Max: 36.9 (24 Feb 2018 18:03)  T(F): 98.1 (25 Feb 2018 08:23), Max: 98.4 (24 Feb 2018 18:03)  HR: 92 (25 Feb 2018 08:23) (90 - 104)  BP: 154/80 (25 Feb 2018 08:23) (101/57 - 154/80)  BP(mean): 90 (24 Feb 2018 16:00) (80 - 90)  RR: 18 (25 Feb 2018 08:23) (17 - 26)  SpO2: 93% (25 Feb 2018 08:23) (93% - 97%)I&O's Summary    24 Feb 2018 07:01  -  25 Feb 2018 07:00  --------------------------------------------------------  IN: 1370 mL / OUT: 0 mL / NET: 1370 mL    25 Feb 2018 07:01  -  25 Feb 2018 12:51  --------------------------------------------------------  IN: 420 mL / OUT: 0 mL / NET: 420 mL      PHYSICAL EXAM:  GENERAL: NAD,  HEENT: PERRL, +EOMI, anicteric, no [place]  NECK: Supple, No JVD   CHEST/LUNG: CTA bilaterally; Normal effort  HEART: S1S2 Normal intensity, no murmurs, gallops or rubs noted  ABDOMEN: Soft, BS Normoactive, NT, ND, no HSM noted  EXTREMITIES:  No clubbing, cyanosis, or edema noted. Limited mobility  SKIN: No rashes or lesions noted  NEURO: Dysphonia, no focal deficits noted, CN II-XII intact  PSYCH: Depressed mood and affect; insight/judgement inappropriate  LABS:                        9.5    5.8   )-----------( 166      ( 25 Feb 2018 08:24 )             29.0     02-25    146<H>  |  110<H>  |  25.0<H>  ----------------------------<  119<H>  3.2<L>   |  25.0  |  0.43<L>    Ca    9.0      25 Feb 2018 08:24  Phos  2.4     02-24  Mg     2.3     02-24          RADIOLOGY & ADDITIONAL TESTS:    MEDICATIONS:  MEDICATIONS  (STANDING):  calcium gluconate IVPB 1 Gram(s) IV Intermittent daily  dextrose 5% + sodium chloride 0.45%. 1000 milliLiter(s) (80 mL/Hr) IV Continuous <Continuous>  levothyroxine 112 MICROGram(s) Oral daily  pantoprazole  Injectable 40 milliGRAM(s) IV Push every 12 hours    MEDICATIONS  (PRN):

## 2018-02-25 NOTE — PROGRESS NOTE ADULT - SUBJECTIVE AND OBJECTIVE BOX
CT report reviewed.  New G tube is in place within the stomach.  No extravasation or leakage. No other pathology identified.      OK to restart TF as previously ordered.

## 2018-02-25 NOTE — PROGRESS NOTE ADULT - SUBJECTIVE AND OBJECTIVE BOX
Patient seen and examined;  Events noted.  Had tolerated TF with glucerna at 30 cc/ hr.  Pt noted some pain at G Tube site.  and mild leakage inferiorly from G Tube.  No bleeding.  1 small BM earlier today.      PAST MEDICAL & SURGICAL HISTORY:  Compression fracture of thoracic vertebra: T12  Hypothyroidism  HLD (hyperlipidemia)  HTN (hypertension)  Dementia  History of hip surgery: B/L  S/P percutaneous endoscopic gastrostomy (PEG) tube placement      ROS:  No Heartburn, regurgitation, dysphagia, odynophagia.  No dyspepsia  No abdominal pain.    No Nausea, vomiting.  No Bleeding.  No hematemesis.   No diarrhea.    No hematochesia.  No weight loss, anorexia.  No edema.      MEDICATIONS  (STANDING):  calcium gluconate IVPB 1 Gram(s) IV Intermittent daily  dextrose 5% + sodium chloride 0.45%. 1000 milliLiter(s) (80 mL/Hr) IV Continuous <Continuous>  levothyroxine 112 MICROGram(s) Oral daily  pantoprazole  Injectable 40 milliGRAM(s) IV Push every 12 hours    MEDICATIONS  (PRN):  Allergies  No Known Allergies      Vital Signs Last 24 Hrs  T(C): 36.7 (25 Feb 2018 08:23), Max: 36.9 (24 Feb 2018 18:03)  T(F): 98.1 (25 Feb 2018 08:23), Max: 98.4 (24 Feb 2018 18:03)  HR: 92 (25 Feb 2018 08:23) (90 - 94)  BP: 154/80 (25 Feb 2018 08:23) (101/57 - 154/80)  BP(mean): --  RR: 18 (25 Feb 2018 08:23) (17 - 18)  SpO2: 93% (25 Feb 2018 08:23) (93% - 97%)    PHYSICAL EXAM:    GENERAL: NAD, well-groomed, well-developed  HEAD:  Atraumatic, Normocephalic  EYES: EOMI, PERRLA, conjunctiva and sclera clear  ENMT: No tonsillar erythema, exudates, or enlargement; Moist mucous membranes, Good dentition, No lesions  NECK: Supple, No JVD, Normal thyroid  CHEST/LUNG: Clear to percussion bilaterally; No rales, rhonchi, wheezing, or rubs  HEART: Regular rate and rhythm; No murmurs, rubs, or gallops  ABDOMEN: Soft, Nontender, Nondistended; Bowel sounds present  EXTREMITIES:  2+ Peripheral Pulses, No clubbing, cyanosis, or edema  LYMPH: No lymphadenopathy noted  SKIN: No rashes or lesions      LABS:                        9.5    5.8   )-----------( 166      ( 25 Feb 2018 08:24 )             29.0     02-25    146<H>  |  110<H>  |  25.0<H>  ----------------------------<  119<H>  3.2<L>   |  25.0  |  0.43<L>    Ca    9.0      25 Feb 2018 08:24  Phos  2.4     02-24  Mg     2.3     02-24               RADIOLOGY & ADDITIONAL STUDIES:

## 2018-02-26 LAB
-  AMIKACIN: SIGNIFICANT CHANGE UP
-  AMPICILLIN/SULBACTAM: SIGNIFICANT CHANGE UP
-  AMPICILLIN: SIGNIFICANT CHANGE UP
-  AZTREONAM: SIGNIFICANT CHANGE UP
-  CEFAZOLIN: SIGNIFICANT CHANGE UP
-  CEFEPIME: SIGNIFICANT CHANGE UP
-  CEFOXITIN: SIGNIFICANT CHANGE UP
-  CEFTAZIDIME: SIGNIFICANT CHANGE UP
-  CEFTRIAXONE: SIGNIFICANT CHANGE UP
-  CIPROFLOXACIN: SIGNIFICANT CHANGE UP
-  ERTAPENEM: SIGNIFICANT CHANGE UP
-  GENTAMICIN: SIGNIFICANT CHANGE UP
-  LEVOFLOXACIN: SIGNIFICANT CHANGE UP
-  MEROPENEM: SIGNIFICANT CHANGE UP
-  NITROFURANTOIN: SIGNIFICANT CHANGE UP
-  PIPERACILLIN/TAZOBACTAM: SIGNIFICANT CHANGE UP
-  TOBRAMYCIN: SIGNIFICANT CHANGE UP
-  TRIMETHOPRIM/SULFAMETHOXAZOLE: SIGNIFICANT CHANGE UP
CULTURE RESULTS: SIGNIFICANT CHANGE UP
METHOD TYPE: SIGNIFICANT CHANGE UP
ORGANISM # SPEC MICROSCOPIC CNT: SIGNIFICANT CHANGE UP
SPECIMEN SOURCE: SIGNIFICANT CHANGE UP

## 2018-02-26 PROCEDURE — 99233 SBSQ HOSP IP/OBS HIGH 50: CPT

## 2018-02-26 PROCEDURE — 99232 SBSQ HOSP IP/OBS MODERATE 35: CPT

## 2018-02-26 RX ORDER — POTASSIUM CHLORIDE 20 MEQ
40 PACKET (EA) ORAL ONCE
Qty: 0 | Refills: 0 | Status: COMPLETED | OUTPATIENT
Start: 2018-02-26 | End: 2018-02-26

## 2018-02-26 RX ORDER — SODIUM CHLORIDE 9 MG/ML
1000 INJECTION, SOLUTION INTRAVENOUS
Qty: 0 | Refills: 0 | Status: DISCONTINUED | OUTPATIENT
Start: 2018-02-26 | End: 2018-03-01

## 2018-02-26 RX ORDER — POTASSIUM CHLORIDE 20 MEQ
40 PACKET (EA) ORAL DAILY
Qty: 0 | Refills: 0 | Status: COMPLETED | OUTPATIENT
Start: 2018-02-26 | End: 2018-02-27

## 2018-02-26 RX ADMIN — Medication 112 MICROGRAM(S): at 05:27

## 2018-02-26 RX ADMIN — SODIUM CHLORIDE 60 MILLILITER(S): 9 INJECTION, SOLUTION INTRAVENOUS at 14:16

## 2018-02-26 RX ADMIN — SODIUM CHLORIDE 80 MILLILITER(S): 9 INJECTION, SOLUTION INTRAVENOUS at 05:27

## 2018-02-26 RX ADMIN — Medication 40 MILLIEQUIVALENT(S): at 11:13

## 2018-02-26 RX ADMIN — PANTOPRAZOLE SODIUM 40 MILLIGRAM(S): 20 TABLET, DELAYED RELEASE ORAL at 17:18

## 2018-02-26 RX ADMIN — SODIUM CHLORIDE 80 MILLILITER(S): 9 INJECTION, SOLUTION INTRAVENOUS at 11:13

## 2018-02-26 RX ADMIN — PANTOPRAZOLE SODIUM 40 MILLIGRAM(S): 20 TABLET, DELAYED RELEASE ORAL at 05:26

## 2018-02-26 RX ADMIN — Medication 40 MILLIEQUIVALENT(S): at 14:16

## 2018-02-26 RX ADMIN — Medication 200 GRAM(S): at 11:13

## 2018-02-26 NOTE — PHYSICAL THERAPY INITIAL EVALUATION ADULT - ADDITIONAL COMMENTS
Pt. unable to report social or functional hx due to hx of dementia. Pt. poor historian. Chart reveals pt. presents from home with daughter, however, no further details available.

## 2018-02-26 NOTE — PHYSICAL THERAPY INITIAL EVALUATION ADULT - PERTINENT HX OF CURRENT PROBLEM, REHAB EVAL
As per H&P: 87 year old female brought by EMS with clogged PEG Tube. Patient had PEG placed 2 weeks prior to presenting to ED at [facility] due to dysphagia and recurrent aspiration. It was not working since yesterday and patient was taken to [facility] where it was flushed and later patient was discharged home. But it got clogged again.; s/p G tube replacement

## 2018-02-26 NOTE — PHYSICAL THERAPY INITIAL EVALUATION ADULT - MANUAL MUSCLE TESTING RESULTS, REHAB EVAL
RIght UE; shoulder flexion 1/5, elbox flexion 1/5, wrist and hand 2/5. LEft UE shoulder flexion 1/5, elbow flexion 3/5, wrist and hand 3/5. RN Guillermina aware of right UE weakness and reports pt. presented to [facility] with weakness - not new. B/L LEs grossly 3/5

## 2018-02-26 NOTE — PROGRESS NOTE ADULT - SUBJECTIVE AND OBJECTIVE BOX
CC: S/p IR gastrostomy feeding tube placement.  Using now for feeding with no significant leak.    HPI:  History was taken from charts and ED Physician as no family member is available at this time. Called patient's daughter - Monse Newsome but couldn't get in touch. Patient has dementia and is unable to provide any information.   87 years old female brought by EMS with clogged PEG Tube. Patient had PEG placed 2 weeks ago at [facility] due to dysphagia and recurrent aspiration. It was not working since yesterday and patient was taken to [facility] where it was flushed and later patient was discharged home. But it got clogged again so patient was brought to [facility]. Family refused to take her back to [facility].  No fever, abdominal pain, nausea or vomiting. (22 Feb 2018 00:47)    REVIEW OF SYSTEMS:    Patient denied fever, chills, abdominal pain, nausea, vomiting, cough, shortness of breath, chest pain or palpitations    Vital Signs Last 24 Hrs  T(C): 36.6 (26 Feb 2018 08:15), Max: 36.8 (25 Feb 2018 23:30)  T(F): 97.8 (26 Feb 2018 08:15), Max: 98.2 (25 Feb 2018 23:30)  HR: 84 (26 Feb 2018 08:15) (82 - 84)  BP: 121/65 (26 Feb 2018 08:15) (112/61 - 121/65)  BP(mean): --  RR: 98 (26 Feb 2018 08:15) (18 - 98)  SpO2: 95% (25 Feb 2018 23:30) (95% - 95%)I&O's Summary    25 Feb 2018 07:01  -  26 Feb 2018 07:00  --------------------------------------------------------  IN: 980 mL / OUT: 0 mL / NET: 980 mL    26 Feb 2018 07:01  -  26 Feb 2018 13:15  --------------------------------------------------------  IN: 900 mL / OUT: 0 mL / NET: 900 mL      PHYSICAL EXAM:  GENERAL: NAD, well-groomed  HEENT: PERRL, +EOMI, anicteric, no [place]  NECK: Supple, No JVD   CHEST/LUNG: CTA bilaterally; Normal effort  HEART: S1S2 Normal intensity, no murmurs, gallops or rubs noted  ABDOMEN: Soft, BS Normoactive, NT, ND, no HSM noted. Peg tube.   EXTREMITIES:  2+ radial and DP pulses noted, no clubbing, cyanosis, or edema noted, FROM x 4  SKIN: No rashes or lesions noted  NEURO: A&O, no focal deficits noted, CN II-XII intact  PSYCH: Depressed mood and affect; insight/judgement appropriate  LABS:                        9.5    5.8   )-----------( 166      ( 25 Feb 2018 08:24 )             29.0     02-25    146<H>  |  110<H>  |  25.0<H>  ----------------------------<  119<H>  3.2<L>   |  25.0  |  0.43<L>    Ca    9.0      25 Feb 2018 08:24          RADIOLOGY & ADDITIONAL TESTS:    MEDICATIONS:  MEDICATIONS  (STANDING):  calcium gluconate IVPB 1 Gram(s) IV Intermittent daily  dextrose 5% + sodium chloride 0.45%. 1000 milliLiter(s) (80 mL/Hr) IV Continuous <Continuous>  levothyroxine 112 MICROGram(s) Oral daily  pantoprazole  Injectable 40 milliGRAM(s) IV Push every 12 hours    MEDICATIONS  (PRN):

## 2018-02-26 NOTE — PHYSICAL THERAPY INITIAL EVALUATION ADULT - LEVEL OF INDEPENDENCE: GAIT, REHAB EVAL
Attempted x2 with maximal assist. pt. reporting "I can't. I know what I can do." and sitting back down/unable to perform

## 2018-02-26 NOTE — PHYSICAL THERAPY INITIAL EVALUATION ADULT - CRITERIA FOR SKILLED THERAPEUTIC INTERVENTIONS
risk reduction/prevention/anticipated discharge recommendation/rehab potential/predicted duration of therapy intervention/impairments found/functional limitations in following categories/therapy frequency/anticipated equipment needs at discharge

## 2018-02-26 NOTE — PHYSICAL THERAPY INITIAL EVALUATION ADULT - ACTIVE RANGE OF MOTION EXAMINATION, REHAB EVAL
with exception to b/l shoulder flexion and right elbox flexion due to weakness/no Active ROM deficits were identified

## 2018-02-26 NOTE — PROGRESS NOTE ADULT - SUBJECTIVE AND OBJECTIVE BOX
INTERVAL HPI/OVERNIGHT EVENTS:    MEDICATIONS  (STANDING):  calcium gluconate IVPB 1 Gram(s) IV Intermittent daily  dextrose 5% + sodium chloride 0.45%. 1000 milliLiter(s) (80 mL/Hr) IV Continuous <Continuous>  levothyroxine 112 MICROGram(s) Oral daily  pantoprazole  Injectable 40 milliGRAM(s) IV Push every 12 hours    MEDICATIONS  (PRN):      Allergies    No Known Allergies    Intolerances        Vital Signs Last 24 Hrs  T(C): 36.8 (25 Feb 2018 23:30), Max: 36.8 (25 Feb 2018 23:30)  T(F): 98.2 (25 Feb 2018 23:30), Max: 98.2 (25 Feb 2018 23:30)  HR: 82 (25 Feb 2018 23:30) (82 - 92)  BP: 112/61 (25 Feb 2018 23:30) (112/61 - 154/80)  BP(mean): --  RR: 18 (25 Feb 2018 23:30) (18 - 18)  SpO2: 95% (25 Feb 2018 23:30) (93% - 95%)    LABS:                        9.5    5.8   )-----------( 166      ( 25 Feb 2018 08:24 )             29.0     02-25    146<H>  |  110<H>  |  25.0<H>  ----------------------------<  119<H>  3.2<L>   |  25.0  |  0.43<L>    Ca    9.0      25 Feb 2018 08:24            RADIOLOGY & ADDITIONAL TESTS: INTERVAL HPI/OVERNIGHT EVENTS:FU for GI bleeding after IR guided G tube placement s/p endoscopic hemostasis. ? G tube leakage. CT abdomen revealed G tube in place. No events overnight as per the RN. Tube feeding going on at 40 mL/hour. Morning labs pending.     MEDICATIONS  (STANDING):  calcium gluconate IVPB 1 Gram(s) IV Intermittent daily  dextrose 5% + sodium chloride 0.45%. 1000 milliLiter(s) (80 mL/Hr) IV Continuous <Continuous>  levothyroxine 112 MICROGram(s) Oral daily  pantoprazole  Injectable 40 milliGRAM(s) IV Push every 12 hours    MEDICATIONS  (PRN):      Allergies    No Known Allergies    Intolerances        Vital Signs Last 24 Hrs  T(C): 36.8 (25 Feb 2018 23:30), Max: 36.8 (25 Feb 2018 23:30)  T(F): 98.2 (25 Feb 2018 23:30), Max: 98.2 (25 Feb 2018 23:30)  HR: 82 (25 Feb 2018 23:30) (82 - 92)  BP: 112/61 (25 Feb 2018 23:30) (112/61 - 154/80)  BP(mean): --  RR: 18 (25 Feb 2018 23:30) (18 - 18)  SpO2: 95% (25 Feb 2018 23:30) (93% - 95%)    LABS:                        9.5    5.8   )-----------( 166      ( 25 Feb 2018 08:24 )             29.0     02-25    146<H>  |  110<H>  |  25.0<H>  ----------------------------<  119<H>  3.2<L>   |  25.0  |  0.43<L>    Ca    9.0      25 Feb 2018 08:24            RADIOLOGY & ADDITIONAL TESTS:  < from: CT Abdomen and Pelvis w/ Oral Cont (02.25.18 @ 16:39) >  CLINICAL HISTORY: Replacement of G-tube of 3 days ago resulting in a GI   bleed which was endoscopically.. G-tube position within the last stomach.   Reassessment of a G-tube and abdominal CT of following hand injection of   dilute contrast through indwelling NG tube.    Technique: contiguous axial images were obtained with 5.0 mm slice   thickness without intravenous contrast administration, which limits the   visualization of the intra-abdominal structures. Diluted contrast   administered through a G-tube by radiologist. Coronal and sagittal   reformats were also submitted for interpretation.      FINDINGS:   A G-tube is in proper position within the body of the stomach with   contrast injected with no extravasation of contrast from the stomach.   Remaining bowel pattern aside from mild diverticulosis of the sigmoid   colon unremarkable.  Lung bases show left lung base subsegmental on atelectatic consolidation.     There is no free intra-abdominal air or ascites.     The unopacified liver, spleen, pancreas, adrenal glandsand gallbladder   are normal.     There is no intra or extrahepatic biliary ductal dilatation.    .    Bilateral lower renal calyceal nonobstructing stones seen. Left lateral   cortical cyst measures 1.6 cm..     Bladder and perineum  The visualized due to a bilateral streak artifacts from metallic hip   prostheses.      There are no retroperitoneal masses or abnormal lymphadenopathy.  The retroperitoneal vessels show atherosclerotic calcified plaques   throughout  nondilated abdominal aorta and iliac arteries.  The bones and   soft tissues are within normal limits.    IMPRESSION:     Contrast injected through G-tube shows no extravasation within stomach.   The G-tube balloon properly positioned within the body of the stomach.    < end of copied text >

## 2018-02-27 LAB
ANION GAP SERPL CALC-SCNC: 10 MMOL/L — SIGNIFICANT CHANGE UP (ref 5–17)
BUN SERPL-MCNC: 14 MG/DL — SIGNIFICANT CHANGE UP (ref 8–20)
CALCIUM SERPL-MCNC: 9.5 MG/DL — SIGNIFICANT CHANGE UP (ref 8.6–10.2)
CHLORIDE SERPL-SCNC: 108 MMOL/L — HIGH (ref 98–107)
CO2 SERPL-SCNC: 24 MMOL/L — SIGNIFICANT CHANGE UP (ref 22–29)
CREAT SERPL-MCNC: 0.45 MG/DL — LOW (ref 0.5–1.3)
GLUCOSE SERPL-MCNC: 168 MG/DL — HIGH (ref 70–115)
HCT VFR BLD CALC: 29.4 % — LOW (ref 37–47)
HGB BLD-MCNC: 9.4 G/DL — LOW (ref 12–16)
MCHC RBC-ENTMCNC: 28.8 PG — SIGNIFICANT CHANGE UP (ref 27–31)
MCHC RBC-ENTMCNC: 32 G/DL — SIGNIFICANT CHANGE UP (ref 32–36)
MCV RBC AUTO: 90.2 FL — SIGNIFICANT CHANGE UP (ref 81–99)
PLATELET # BLD AUTO: 172 K/UL — SIGNIFICANT CHANGE UP (ref 150–400)
POTASSIUM SERPL-MCNC: 3.8 MMOL/L — SIGNIFICANT CHANGE UP (ref 3.5–5.3)
POTASSIUM SERPL-SCNC: 3.8 MMOL/L — SIGNIFICANT CHANGE UP (ref 3.5–5.3)
RBC # BLD: 3.26 M/UL — LOW (ref 4.4–5.2)
RBC # FLD: 15.2 % — SIGNIFICANT CHANGE UP (ref 11–15.6)
SODIUM SERPL-SCNC: 142 MMOL/L — SIGNIFICANT CHANGE UP (ref 135–145)
WBC # BLD: 5.5 K/UL — SIGNIFICANT CHANGE UP (ref 4.8–10.8)
WBC # FLD AUTO: 5.5 K/UL — SIGNIFICANT CHANGE UP (ref 4.8–10.8)

## 2018-02-27 PROCEDURE — 99233 SBSQ HOSP IP/OBS HIGH 50: CPT

## 2018-02-27 RX ADMIN — PANTOPRAZOLE SODIUM 40 MILLIGRAM(S): 20 TABLET, DELAYED RELEASE ORAL at 17:10

## 2018-02-27 RX ADMIN — Medication 112 MICROGRAM(S): at 06:17

## 2018-02-27 RX ADMIN — Medication 40 MILLIEQUIVALENT(S): at 13:15

## 2018-02-27 RX ADMIN — Medication 200 GRAM(S): at 13:34

## 2018-02-27 RX ADMIN — PANTOPRAZOLE SODIUM 40 MILLIGRAM(S): 20 TABLET, DELAYED RELEASE ORAL at 06:17

## 2018-02-27 NOTE — PROGRESS NOTE ADULT - SUBJECTIVE AND OBJECTIVE BOX
Interval: No acute overnight events as per RN. 86 y/o female admitted for clogged G tube, course complicated by hemorrhagic shock, MTP activated, had therapeutic endoscopy, gastric clips and cautery. Had been admitted to MICU and intubated, now extubated and hemodynamically stable on medical floor. Pt seen/examined by me. Pt history taken vis  # [id number], as pt is Telugu speaking. Pt sitting up in bed, appears comfortable, A & O X 1 to person, answers some questions appropriately     REVIEW OF SYSTEMS:    Denies pain, full ROS difficult to obtain due to dementia.     Vital Signs Last 24 Hrs  T(C): 36.7 (27 Feb 2018 08:09), Max: 37.4 (26 Feb 2018 17:06)  T(F): 98 (27 Feb 2018 08:09), Max: 99.4 (26 Feb 2018 17:06)  HR: 83 (27 Feb 2018 08:09) (83 - 104)  BP: 131/81 (27 Feb 2018 08:09) (113/67 - 131/81)  BP(mean): --  RR: 19 (27 Feb 2018 08:09) (17 - 19)  SpO2: 95% (26 Feb 2018 23:49) (95% - 95%)I&O's Summary    26 Feb 2018 07:01  -  27 Feb 2018 07:00  --------------------------------------------------------  IN: 2715 mL / OUT: 0 mL / NET: 2715 mL    27 Feb 2018 07:01  -  27 Feb 2018 16:08  --------------------------------------------------------  IN: 1010 mL / OUT: 0 mL / NET: 1010 mL    CAPILLARY BLOOD GLUCOSE    PHYSICAL EXAM:  GENERAL: frail, elderly, NAD, well-groomed  HEENT: PERRL, +EOMI, anicteric  NECK: Supple, No JVD   CHEST/LUNG: CTA bilaterally; Normal effort  HEART: S1S2 Normal intensity, no murmurs, gallops or rubs noted  ABDOMEN: PEG tube in place, dressing intact and dry, soft, NT/ND  EXTREMITIES:  VCDs in place, 2+ radial and DP pulses noted, no clubbing, cyanosis, or edema noted  SKIN: No rashes or lesions noted  NEURO: difficult exam due to dementia, appears grossly intact  PSYCH: normal mood and affect; insight/judgement appropriate  LABS:                        9.4    5.5   )-----------( 172      ( 27 Feb 2018 07:39 )             29.4     02-27    142  |  108<H>  |  14.0  ----------------------------<  168<H>  3.8   |  24.0  |  0.45<L>    Ca    9.5      27 Feb 2018 07:39     RADIOLOGY & ADDITIONAL TESTS:    CT abd/pel 2/25/18   IMPRESSION:   Contrast injected through G-tube shows no extravasation within stomach.   The G-tube balloon properly positioned within the body of the stomach.      MEDICATIONS:  MEDICATIONS  (STANDING):  calcium gluconate IVPB 1 Gram(s) IV Intermittent daily  levothyroxine 112 MICROGram(s) Oral daily  pantoprazole  Injectable 40 milliGRAM(s) IV Push every 12 hours  sodium chloride 0.45%. 1000 milliLiter(s) (60 mL/Hr) IV Continuous <Continuous>    MEDICATIONS  (PRN):

## 2018-02-28 DIAGNOSIS — N17.9 ACUTE KIDNEY FAILURE, UNSPECIFIED: ICD-10-CM

## 2018-02-28 DIAGNOSIS — D64.9 ANEMIA, UNSPECIFIED: ICD-10-CM

## 2018-02-28 DIAGNOSIS — Z29.9 ENCOUNTER FOR PROPHYLACTIC MEASURES, UNSPECIFIED: ICD-10-CM

## 2018-02-28 DIAGNOSIS — N39.0 URINARY TRACT INFECTION, SITE NOT SPECIFIED: ICD-10-CM

## 2018-02-28 LAB
ALBUMIN SERPL ELPH-MCNC: 2.9 G/DL — LOW (ref 3.3–5.2)
ALP SERPL-CCNC: 86 U/L — SIGNIFICANT CHANGE UP (ref 40–120)
ALT FLD-CCNC: 9 U/L — SIGNIFICANT CHANGE UP
ANION GAP SERPL CALC-SCNC: 11 MMOL/L — SIGNIFICANT CHANGE UP (ref 5–17)
AST SERPL-CCNC: 9 U/L — SIGNIFICANT CHANGE UP
BILIRUB SERPL-MCNC: <0.2 MG/DL — LOW (ref 0.4–2)
BUN SERPL-MCNC: 16 MG/DL — SIGNIFICANT CHANGE UP (ref 8–20)
CALCIUM SERPL-MCNC: 9.2 MG/DL — SIGNIFICANT CHANGE UP (ref 8.6–10.2)
CHLORIDE SERPL-SCNC: 104 MMOL/L — SIGNIFICANT CHANGE UP (ref 98–107)
CO2 SERPL-SCNC: 23 MMOL/L — SIGNIFICANT CHANGE UP (ref 22–29)
CREAT SERPL-MCNC: 0.43 MG/DL — LOW (ref 0.5–1.3)
GLUCOSE SERPL-MCNC: 147 MG/DL — HIGH (ref 70–115)
HCT VFR BLD CALC: 27.7 % — LOW (ref 37–47)
HGB BLD-MCNC: 9.1 G/DL — LOW (ref 12–16)
MCHC RBC-ENTMCNC: 29.7 PG — SIGNIFICANT CHANGE UP (ref 27–31)
MCHC RBC-ENTMCNC: 32.9 G/DL — SIGNIFICANT CHANGE UP (ref 32–36)
MCV RBC AUTO: 90.5 FL — SIGNIFICANT CHANGE UP (ref 81–99)
PLATELET # BLD AUTO: 174 K/UL — SIGNIFICANT CHANGE UP (ref 150–400)
POTASSIUM SERPL-MCNC: 3.7 MMOL/L — SIGNIFICANT CHANGE UP (ref 3.5–5.3)
POTASSIUM SERPL-SCNC: 3.7 MMOL/L — SIGNIFICANT CHANGE UP (ref 3.5–5.3)
PROT SERPL-MCNC: 5.4 G/DL — LOW (ref 6.6–8.7)
RBC # BLD: 3.06 M/UL — LOW (ref 4.4–5.2)
RBC # FLD: 15 % — SIGNIFICANT CHANGE UP (ref 11–15.6)
SODIUM SERPL-SCNC: 138 MMOL/L — SIGNIFICANT CHANGE UP (ref 135–145)
WBC # BLD: 7.6 K/UL — SIGNIFICANT CHANGE UP (ref 4.8–10.8)
WBC # FLD AUTO: 7.6 K/UL — SIGNIFICANT CHANGE UP (ref 4.8–10.8)

## 2018-02-28 PROCEDURE — 99233 SBSQ HOSP IP/OBS HIGH 50: CPT

## 2018-02-28 RX ORDER — LACTOBACILLUS ACIDOPHILUS 100MM CELL
1 CAPSULE ORAL EVERY 12 HOURS
Qty: 0 | Refills: 0 | Status: DISCONTINUED | OUTPATIENT
Start: 2018-02-28 | End: 2018-03-01

## 2018-02-28 RX ORDER — CEFTRIAXONE 500 MG/1
INJECTION, POWDER, FOR SOLUTION INTRAMUSCULAR; INTRAVENOUS
Qty: 0 | Refills: 0 | Status: DISCONTINUED | OUTPATIENT
Start: 2018-02-28 | End: 2018-02-28

## 2018-02-28 RX ORDER — PANTOPRAZOLE SODIUM 20 MG/1
40 TABLET, DELAYED RELEASE ORAL
Qty: 0 | Refills: 0 | Status: DISCONTINUED | OUTPATIENT
Start: 2018-02-28 | End: 2018-03-01

## 2018-02-28 RX ORDER — CIPROFLOXACIN LACTATE 400MG/40ML
250 VIAL (ML) INTRAVENOUS EVERY 12 HOURS
Qty: 0 | Refills: 0 | Status: DISCONTINUED | OUTPATIENT
Start: 2018-02-28 | End: 2018-03-01

## 2018-02-28 RX ORDER — CEFTRIAXONE 500 MG/1
1 INJECTION, POWDER, FOR SOLUTION INTRAMUSCULAR; INTRAVENOUS ONCE
Qty: 0 | Refills: 0 | Status: COMPLETED | OUTPATIENT
Start: 2018-02-28 | End: 2018-02-28

## 2018-02-28 RX ADMIN — PANTOPRAZOLE SODIUM 40 MILLIGRAM(S): 20 TABLET, DELAYED RELEASE ORAL at 05:53

## 2018-02-28 RX ADMIN — CEFTRIAXONE 100 GRAM(S): 500 INJECTION, POWDER, FOR SOLUTION INTRAMUSCULAR; INTRAVENOUS at 09:00

## 2018-02-28 RX ADMIN — Medication 1 TABLET(S): at 18:12

## 2018-02-28 RX ADMIN — SODIUM CHLORIDE 60 MILLILITER(S): 9 INJECTION, SOLUTION INTRAVENOUS at 18:11

## 2018-02-28 RX ADMIN — PANTOPRAZOLE SODIUM 40 MILLIGRAM(S): 20 TABLET, DELAYED RELEASE ORAL at 18:12

## 2018-02-28 RX ADMIN — Medication 250 MILLIGRAM(S): at 13:00

## 2018-02-28 RX ADMIN — Medication 112 MICROGRAM(S): at 05:53

## 2018-02-28 RX ADMIN — Medication 250 MILLIGRAM(S): at 18:11

## 2018-02-28 NOTE — PROGRESS NOTE ADULT - PROBLEM SELECTOR PLAN 7
DVT: VCDs. GI: Protonix 40 mg IVP q12h    Supportive care. PT.  working with daughter, as pt has 24/7 help, daughter not happy with aides. [facility] CM In touch w Medicaid [employer] CM to work on the situation. Information received 2/27 that Ensure was being poured directly into PEG tube. ?? reliability history  Discussed with CM, pt has supplies at home for 2CalHN bolus feeds, 4 cans/day following [employer] [facility] Cm obtained information from daughter. Will obtain dietitian consult to determine if this is appropriate. Daughter willing to be educated on the functionality and use of PEG tube.

## 2018-02-28 NOTE — CHART NOTE - NSCHARTNOTEFT_GEN_A_CORE
Source: Patient [ ]  Family [ ]   other [x ]    Current Diet: Jevity @ 55 ml / hr    Patient reports [ ] nausea  [ ] vomiting [ ] diarrhea [ ] constipation  [ ]chewing problems [ ] swallowing issues  [ ] other:     PO intake:  < 50% [ ]   50-75%  [ ]   %  [ ]  other :    Source for PO intake [ ] Patient [ ] family [ x] chart [ ] staff [ ] other    Enteral /Parenteral Nutrition:     Current Weight:  56kg per bed scale    % Weight Change     Pertinent Medications: MEDICATIONS  (STANDING):  ciprofloxacin     Tablet 250 milliGRAM(s) Oral every 12 hours  lactobacillus acidophilus 1 Tablet(s) Oral every 12 hours  levothyroxine 112 MICROGram(s) Oral daily  pantoprazole   Suspension 40 milliGRAM(s) Oral two times a day before meals  sodium chloride 0.45%. 1000 milliLiter(s) (60 mL/Hr) IV Continuous <Continuous>    MEDICATIONS  (PRN):    Pertinent Labs: CBC Full  -  ( 28 Feb 2018 07:54 )  WBC Count : 7.6 K/uL  Hemoglobin : 9.1 g/dL  Hematocrit : 27.7 %  Platelet Count - Automated : 174 K/uL  Mean Cell Volume : 90.5 fl  Mean Cell Hemoglobin : 29.7 pg  Mean Cell Hemoglobin Concentration : 32.9 g/dL  Auto Neutrophil # : x  Auto Lymphocyte # : x  Auto Monocyte # : x  Auto Eosinophil # : x  Auto Basophil # : x  Auto Neutrophil % : x  Auto Lymphocyte % : x  Auto Monocyte % : x  Auto Eosinophil % : x  Auto Basophil % : x          Skin:     Nutrition focused physical exam conducted - found signs of malnutrition [ ]absent [ ]present    Subcutaneous fat loss: [ ] Orbital fat pads region, [ ]Buccal fat region, [ ]Triceps region,  [ ]Ribs region    Muscle wasting: [ ]Temples region, [ ]Clavicle region, [ ]Shoulder region, [ ]Scapula region, [ ]Interosseous region,  [ ]thigh region, [ ]Calf region    Estimated Needs:   [ ] no change since previous assessment  [ x] recalculated:  (1400- 1680 kcal)  56-67 g pro / day    Current Nutrition Diagnosis: Pt presents at risk secondary to difficulty chewing/ swallowing related to dementia and debility, as evidenced by reliance on artificial nutrition. Pt currently receiving Jevity 1.5 @ 55 ml/ hr which is appropriate to meet needs.     Recommendations:   continue TF regimen  honor pt/ family wishes    Monitoring and Evaluation:   [ ] PO intake [ ] Tolerance to diet prescription [X] Weights  [X] Follow up per protocol [X] Labs:

## 2018-02-28 NOTE — PROGRESS NOTE ADULT - ATTENDING COMMENTS
I saw this patient independently at 1010am and agree with the above.  Her hemorrhagic shock has resolved; discussed with GI; changed to pantoprazole IV q12 and starting tube feeds per their recommendations.  Appreciate surgical opinions.    OK for transfer out of MICU; accepted by Dr Montes.
Awaiting arrangement for home care. Discharge planning .

## 2018-02-28 NOTE — PROGRESS NOTE ADULT - SUBJECTIVE AND OBJECTIVE BOX
Interval: No acute overnight events as per RN.  86 y/o female admitted for clogged G tube, course complicated by hemorrhagic shock, MTP activated, had therapeutic endoscopy, gastric clips and cautery. Had been admitted to MICU and intubated, now extubated and hemodynamically stable on medical floor. Currently receiving feeds through NG tube. Daughter wanting to be educated on use of PEG tube. Pt doing well. Examined laying down in bed, appears comfortable.      REVIEW OF SYSTEMS:    Unable to obtain due to dementia.     Vital Signs Last 24 Hrs  T(C): 36.9 (28 Feb 2018 08:13), Max: 36.9 (27 Feb 2018 23:52)  T(F): 98.4 (28 Feb 2018 08:13), Max: 98.5 (27 Feb 2018 23:52)  HR: 98 (28 Feb 2018 08:13) (96 - 99)  BP: 128/74 (28 Feb 2018 08:13) (125/66 - 128/74)  BP(mean): --  RR: 19 (28 Feb 2018 08:13) (16 - 19)  SpO2: 96% (27 Feb 2018 23:52) (95% - 96%)I&O's Summary    27 Feb 2018 07:01  -  28 Feb 2018 07:00  --------------------------------------------------------  IN: 1950 mL / OUT: 0 mL / NET: 1950 mL    CAPILLARY BLOOD GLUCOSE    PHYSICAL EXAM:  GENERAL: NAD, well-groomed  HEENT: PERRL, +EOMI  NECK: Supple, No JVD   CHEST/LUNG: CTA bilaterally; Normal effort  HEART: S1S2 Normal intensity, no murmurs, gallops or rubs noted  ABDOMEN: Soft, NT, ND, PEG tube in place with clean, dry, intact dressing  EXTREMITIES:  2+ radial and DP pulses noted, no clubbing, cyanosis, or edema noted, FROM x 4  SKIN: No rashes or lesions noted    LABS:                        9.1    7.6   )-----------( 174      ( 28 Feb 2018 07:54 )             27.7     02-28    138  |  104  |  16.0  ----------------------------<  147<H>  3.7   |  23.0  |  0.43<L>    Ca    9.2      28 Feb 2018 07:54    TPro  5.4<L>  /  Alb  2.9<L>  /  TBili  <0.2<L>  /  DBili  x   /  AST  9   /  ALT  9   /  AlkPhos  86  02-28        RADIOLOGY & ADDITIONAL TESTS:    MEDICATIONS:  MEDICATIONS  (STANDING):  ciprofloxacin     Tablet 250 milliGRAM(s) Oral every 12 hours  lactobacillus acidophilus 1 Tablet(s) Oral every 12 hours  levothyroxine 112 MICROGram(s) Oral daily  pantoprazole   Suspension 40 milliGRAM(s) Oral two times a day before meals  sodium chloride 0.45%. 1000 milliLiter(s) (60 mL/Hr) IV Continuous <Continuous>    MEDICATIONS  (PRN):

## 2018-03-01 ENCOUNTER — TRANSCRIPTION ENCOUNTER (OUTPATIENT)
Age: 83
End: 2018-03-01

## 2018-03-01 VITALS
SYSTOLIC BLOOD PRESSURE: 120 MMHG | DIASTOLIC BLOOD PRESSURE: 68 MMHG | RESPIRATION RATE: 19 BRPM | TEMPERATURE: 98 F | OXYGEN SATURATION: 96 % | HEART RATE: 82 BPM

## 2018-03-01 PROCEDURE — 97110 THERAPEUTIC EXERCISES: CPT

## 2018-03-01 PROCEDURE — 94002 VENT MGMT INPAT INIT DAY: CPT

## 2018-03-01 PROCEDURE — 36600 WITHDRAWAL OF ARTERIAL BLOOD: CPT

## 2018-03-01 PROCEDURE — 80053 COMPREHEN METABOLIC PANEL: CPT

## 2018-03-01 PROCEDURE — 80048 BASIC METABOLIC PNL TOTAL CA: CPT

## 2018-03-01 PROCEDURE — 83735 ASSAY OF MAGNESIUM: CPT

## 2018-03-01 PROCEDURE — 82803 BLOOD GASES ANY COMBINATION: CPT

## 2018-03-01 PROCEDURE — 85610 PROTHROMBIN TIME: CPT

## 2018-03-01 PROCEDURE — P9059: CPT

## 2018-03-01 PROCEDURE — 82330 ASSAY OF CALCIUM: CPT

## 2018-03-01 PROCEDURE — 85014 HEMATOCRIT: CPT

## 2018-03-01 PROCEDURE — 83690 ASSAY OF LIPASE: CPT

## 2018-03-01 PROCEDURE — 94003 VENT MGMT INPAT SUBQ DAY: CPT

## 2018-03-01 PROCEDURE — 84145 PROCALCITONIN (PCT): CPT

## 2018-03-01 PROCEDURE — 85730 THROMBOPLASTIN TIME PARTIAL: CPT

## 2018-03-01 PROCEDURE — 86850 RBC ANTIBODY SCREEN: CPT

## 2018-03-01 PROCEDURE — P9016: CPT

## 2018-03-01 PROCEDURE — 82947 ASSAY GLUCOSE BLOOD QUANT: CPT

## 2018-03-01 PROCEDURE — P9037: CPT

## 2018-03-01 PROCEDURE — 97163 PT EVAL HIGH COMPLEX 45 MIN: CPT

## 2018-03-01 PROCEDURE — 99239 HOSP IP/OBS DSCHRG MGMT >30: CPT

## 2018-03-01 PROCEDURE — 84443 ASSAY THYROID STIM HORMONE: CPT

## 2018-03-01 PROCEDURE — 99291 CRITICAL CARE FIRST HOUR: CPT | Mod: 25

## 2018-03-01 PROCEDURE — 74177 CT ABD & PELVIS W/CONTRAST: CPT

## 2018-03-01 PROCEDURE — 76000 FLUOROSCOPY <1 HR PHYS/QHP: CPT

## 2018-03-01 PROCEDURE — 84295 ASSAY OF SERUM SODIUM: CPT

## 2018-03-01 PROCEDURE — 83605 ASSAY OF LACTIC ACID: CPT

## 2018-03-01 PROCEDURE — 31500 INSERT EMERGENCY AIRWAY: CPT

## 2018-03-01 PROCEDURE — 87086 URINE CULTURE/COLONY COUNT: CPT

## 2018-03-01 PROCEDURE — 86901 BLOOD TYPING SEROLOGIC RH(D): CPT

## 2018-03-01 PROCEDURE — 94760 N-INVAS EAR/PLS OXIMETRY 1: CPT

## 2018-03-01 PROCEDURE — 36430 TRANSFUSION BLD/BLD COMPNT: CPT

## 2018-03-01 PROCEDURE — 74176 CT ABD & PELVIS W/O CONTRAST: CPT

## 2018-03-01 PROCEDURE — 82435 ASSAY OF BLOOD CHLORIDE: CPT

## 2018-03-01 PROCEDURE — 86900 BLOOD TYPING SEROLOGIC ABO: CPT

## 2018-03-01 PROCEDURE — 36415 COLL VENOUS BLD VENIPUNCTURE: CPT

## 2018-03-01 PROCEDURE — 71045 X-RAY EXAM CHEST 1 VIEW: CPT

## 2018-03-01 PROCEDURE — 86923 COMPATIBILITY TEST ELECTRIC: CPT

## 2018-03-01 PROCEDURE — 93005 ELECTROCARDIOGRAM TRACING: CPT

## 2018-03-01 PROCEDURE — 84484 ASSAY OF TROPONIN QUANT: CPT

## 2018-03-01 PROCEDURE — 84100 ASSAY OF PHOSPHORUS: CPT

## 2018-03-01 PROCEDURE — 97530 THERAPEUTIC ACTIVITIES: CPT

## 2018-03-01 PROCEDURE — 84132 ASSAY OF SERUM POTASSIUM: CPT

## 2018-03-01 PROCEDURE — 85027 COMPLETE CBC AUTOMATED: CPT

## 2018-03-01 PROCEDURE — L8699: CPT

## 2018-03-01 PROCEDURE — 93970 EXTREMITY STUDY: CPT

## 2018-03-01 PROCEDURE — C1894: CPT

## 2018-03-01 PROCEDURE — 87186 SC STD MICRODIL/AGAR DIL: CPT

## 2018-03-01 PROCEDURE — 81001 URINALYSIS AUTO W/SCOPE: CPT

## 2018-03-01 PROCEDURE — 86920 COMPATIBILITY TEST SPIN: CPT

## 2018-03-01 PROCEDURE — 76942 ECHO GUIDE FOR BIOPSY: CPT

## 2018-03-01 RX ORDER — CIPROFLOXACIN LACTATE 400MG/40ML
1 VIAL (ML) INTRAVENOUS
Qty: 14 | Refills: 0 | OUTPATIENT
Start: 2018-03-01 | End: 2018-03-07

## 2018-03-01 RX ORDER — PANTOPRAZOLE SODIUM 20 MG/1
1 TABLET, DELAYED RELEASE ORAL
Qty: 30 | Refills: 0 | OUTPATIENT
Start: 2018-03-01 | End: 2018-03-30

## 2018-03-01 RX ORDER — APIXABAN 2.5 MG/1
1 TABLET, FILM COATED ORAL
Qty: 0 | Refills: 0 | COMMUNITY

## 2018-03-01 RX ADMIN — Medication 250 MILLIGRAM(S): at 05:01

## 2018-03-01 RX ADMIN — SODIUM CHLORIDE 60 MILLILITER(S): 9 INJECTION, SOLUTION INTRAVENOUS at 08:49

## 2018-03-01 RX ADMIN — Medication 1 TABLET(S): at 05:01

## 2018-03-01 RX ADMIN — PANTOPRAZOLE SODIUM 40 MILLIGRAM(S): 20 TABLET, DELAYED RELEASE ORAL at 05:01

## 2018-03-01 RX ADMIN — Medication 112 MICROGRAM(S): at 05:01

## 2018-03-01 NOTE — PROGRESS NOTE ADULT - PROBLEM SELECTOR PLAN 5
Due to GI bleed. No further active bleed.
Due to GI bleed. No further active bleed.
due to GI bleed
due to GI bleed

## 2018-03-01 NOTE — PROGRESS NOTE ADULT - ASSESSMENT
7 yof with bleeding after peg tube, hemorrhagic shock, acute respiratory failure, acute blood loss anemia     Problem/Plan - 1:  ·  Problem: Gastrostomy hemorrhage.  Plan: h/h stable with no further evidence of bleeding. Contrast CT imaging showing no extravasation of contrast or leak.   GI initiated use of feeding tube and functioning normal      Problem/Plan - 2:  ·  Problem: Dementia.  Plan: stable.       Problem/Plan - 3:  ·  Problem: Acute respiratory failure.  Plan: extubated . No further acute resp distress .      Problem/Plan - 4:  ·  Problem: JANI .  Plan: Due to volume contraction from GI bleed , dehydration.  Prerenal azotemia resolving on  Gentle hydration .  Replace electrolyte K+     Problem/Plan - 5:  ·  Problem: Anemia due to blood loss.  Plan: due to GI bleed. No further active bleed     Problem/Plan - 6  Problem: UTI. Plan: Bacturia, pyuria.  IV abx ceftriaxone 1g iv daily     Supportive care. PT
86 y/o female admitted for clogged G tube, course complicated by hemorrhagic shock, MTP activated, had therapeutic endoscopy, gastric clips and cautery. Had been admitted to MICU and intubated, now extubated and hemodynamically stable on medical floor. Currently receiving feeds through NG tube. Received new information regarding feeds that patient receiving at home. Pt receiving 2CalHN bolus feeds, 4 cans. Daughter wanting to be educated on use of PEG tube before D/C.
86 y/o female admitted for clogged G tube, course complicated by hemorrhagic shock, MTP activated, had therapeutic endoscopy, gastric clips and cautery. Had been admitted to MICU and intubated, now extubated and hemodynamically stable on medical floor. Currently receiving feeds through PEG tube.Discussed case with RD on 2/28/2018, recommended bolus feeds of 200 ml Jevity 1.5 q3h. Received feeds last night and this morning. Trial bolus feeds well tolerated. Will continue bolus feeds at home upon D/C. Daughter wanting to be educated on use of PEG tube. D/C pending 24-7 aide placement at home, discussed with CM. Overall doing well, stable.
87 y.o. female with PMHx of advanced dementia, Hypothyroidism, HTN, HLD p/w dislodged PEG    1. PEG tube replacement by IR when family available to sign consent, NPO for now    2. Hypothyroidism - synthroid    3. HTN/HLD - clarify meds from family when available
87 yof with bleeding after peg tube, hemorrhagic shock, acute respiratory failure, acute blood loss anemia        Advanced directives: full code
87 yof with bleeding after peg tube, hemorrhagic shock, acute respiratory failure, acute blood loss anemia       Problem/Plan - 1:  ·  Problem: Gastrostomy hemorrhage.  Plan: h/h stable   no further evidence of bleeding   tolerating TF will continue to advance TF  Some leak around tube may be due to gastric overdistension.      Problem/Plan - 2:  ·  Problem: Dementia.  Plan: stable.       Problem/Plan - 3:  ·  Problem: Acute respiratory failure.  Plan: extubated  weaned off 02 this am, tolerating .      Problem/Plan - 4:  ·  Problem: Shock.  Plan: resolved due to GI bleed. H/H stable.  Prerenal azotemia. Gentle hydration      Problem/Plan - 5:  ·  Problem: Anemia due to blood loss.  Plan: due to GI bleed. No further active bleed     Supportive care.
87 yof with bleeding after peg tube, hemorrhagic shock, acute respiratory failure, acute blood loss anemia     Problem/Plan - 1:  ·  Problem: Gastrostomy hemorrhage.  Plan: h/h stable   no further evidence of bleeding   Some leak around tube may be due to gastric overdistension.   Holding tube feeding until reevaluation by gastroenterologist.   D5 1/2NS infusion      Problem/Plan - 2:  ·  Problem: Dementia.  Plan: stable.       Problem/Plan - 3:  ·  Problem: Acute respiratory failure.  Plan: extubated . No acute distress .      Problem/Plan - 4:  ·  Problem: JANI .  Plan: Due to volume contraction from GI bleed , dehydration.  Prerenal azotemia resolving on  Gentle hydration .  Replace electrolyte K+     Problem/Plan - 5:  ·  Problem: Anemia due to blood loss.  Plan: due to GI bleed. No further active bleed     Supportive care. PT
87yoF s/p UGIB bleed
88 y/o female admitted for clogged G tube, course complicated by hemorrhagic shock, MTP activated, had therapeutic endoscopy, gastric clips and cautery. Had been admitted to MICU and intubated, now extubated and hemodynamically stable on medical floor.        Problem/Plan - 1:  ·  Problem: Gastrostomy hemorrhage.  Plan: h/h stable with no further evidence of bleeding. Contrast CT imaging showing no extravasation of contrast or leak.   GI initiated use of feeding tube and functioning well     Problem/Plan - 2:  ·  Problem: Dementia.  Plan: stable without agitation.       Problem/Plan - 3:  ·  Problem: Acute respiratory failure.  Plan: extubated . No further acute resp distress.      Problem/Plan - 4:  ·  Problem: JANI .  Plan: Due to volume contraction from GI bleed , dehydration.  Prerenal azotemia resolving on  Gentle hydration .  K + normalized. 3.8 today     Problem/Plan - 5:  ·  Problem: Anemia due to blood loss.  Plan: due to GI bleed. No further active bleed     Problem/Plan - 6  Problem: UTI. Plan: Bacteriuria pyuria.  IV abx ceftriaxone 1g iv daily     Problem/Plan- 7  Problem: Prophylactic measure. Plan: DVT: VCDs. GI: Protonix 40 mg IVP q12h    Supportive care. PT.  working with daughter, as pt has 24/7 help, daughter not happy with aides. [facility] CM In touch w Medicaid Tr[employer] CM to work on the situation. Receiving feeding supplies, states pouring Ensure directly into PEG tube. ?? reliability history   working with daughter who is going to give the the information about her supply company.
Acute UGI bleeding seems to have stopped.  Feels OK.  Looks OK.  Unable to aspirate any gastric contents from the G tube.  Flushes easily.  KT:  Black stool moderate amounts.    OK for restart of TF with Jevity at 30 cc/ hr and increase by 10 cc/ hr q 8 h until target goal of 50 cc. Ok for transfer to floor.
Pain and mild leakage at G tube site.  Tube flushes easily with water.  Modest local pain.  Tube is sutured in place.  Mild leakage inferiorly.  Site looks ok.    G tube was in place on EGD 3 days ago.  Photos clearly demonstrate intragastric location of the G Tube.  2 Clips were placed on an pumping vessel at the  site of insertion of the G Tube with good hemostasis.      Plan for urgent CT abd with oral contrast via the G tube to check site for leakage.  Discussed with Radiology Dr Anna and arranged.
87 yof with bleeding after peg tube, hemorrhagic shock, acute respiratory failure, acute blood loss anemia    Rass - 0  Cam - neg  VAP bundle - yes  GI ppx - yes  DVT ppx - yes  Nutrition - TF holding until am    Invasive Devices: right femoral cortis	    Advanced directives: full code
Patient with G tube placement and subsequent bleeding s/p endoscopic hemostasis. G tube in place confirmed by CT abdomen    1. Continue G tube feeding and advance to target goal  2. Call GI prn

## 2018-03-01 NOTE — DISCHARGE NOTE ADULT - MEDICATION SUMMARY - MEDICATIONS TO TAKE
I will START or STAY ON the medications listed below when I get home from the hospital:    nystatin 100,000 units/g topical cream  -- Apply on skin to affected area 2 times a day  -- Indication: For rash    pantoprazole 40 mg oral granule, delayed release  -- 1 dose(s) by mouth once a day   -- Indication: For Prophylactic measure    ciprofloxacin 250 mg oral tablet  -- 1 tab(s) by mouth every 12 hours  -- Indication: For UTI (urinary tract infection)    levothyroxine 112 mcg (0.112 mg) oral tablet  -- 1 tab(s) by mouth once a day  -- Indication: For Hypothyroidism

## 2018-03-01 NOTE — DISCHARGE NOTE ADULT - HOSPITAL COURSE
88 y/o female admitted for clogged G tube, course complicated by hemorrhagic shock, MTP activated, had therapeutic endoscopy, gastric clips and cautery. Had been admitted to MICU and intubated, now extubated and hemodynamically stable on medical floor. Currently receiving feeds through PEG tube. Discussed case with RD on 2/28/2018, recommended bolus feeds of 200 ml Jevity 1.5 q3h. Received feeds last night and this morning. Trial bolus feeds well tolerated. Daughter wanting to be educated on use of PEG tube. Pt doing well.

## 2018-03-01 NOTE — DISCHARGE NOTE ADULT - CARE PLAN
Principal Discharge DX:	Feeding tube dysfunction, initial encounter  Goal:	return function of feeding tube  Assessment and plan of treatment:	s/p therapeutic endoscopy, gastric clips and cautery, PEG replaced- pt tolerating bolus feeds  Secondary Diagnosis:	Urinary tract infection without hematuria, site unspecified  Goal:	resolution of infection  Assessment and plan of treatment:	complete course of cipro

## 2018-03-01 NOTE — PROGRESS NOTE ADULT - PROVIDER SPECIALTY LIST ADULT
Critical Care
Critical Care
Gastroenterology
Hospitalist
Surgery
Trauma Surgery
Gastroenterology

## 2018-03-01 NOTE — PROGRESS NOTE ADULT - PROBLEM SELECTOR PLAN 1
Status post seemingly successful EGD with hemostatic maneuvers last night in the ED. She had no active bleeding overnight and her Hb has remained stable at roughly 11 grams after transfusion of 4 units of PRBC's. She is presently NPO. She has a PEG tube because of oropharyngeal dysphagia therefore a trial of clear liquids would be contraindicated because of aspiration risk. Would keep NPO today and if stable over the next 24 hours w/o recurrent bleeding could slowly start TF via newly placed gastrostomy tube whose position in the stomach was confirmed during last night's emergent EGD. Same IV Pantoprazole continuous infusion for now.
- No active HD stable  - Appropriate response to blood transfusion  - Wean to extubation  - Daily cbc  - Monitor UOP
H/H stable with no further evidence of bleeding.   Contrast CT imaging showing no extravasation of contrast or leak.   GI initiated use of feeding tube and functioning well.
H/H stable with no further evidence of bleeding.   Contrast CT imaging showing no extravasation of contrast or leak.   GI initiated use of feeding tube and functioning well.
h/h stable   no further evidence of bleeding   tolerating TF will continue to advance TF  Downgraded this afternoon, report endorsed to Dr Montes
monitor cbc, transfused 4 prbc, was on eliquis for ?clot hay clarify with daughter continue to hold NOAC

## 2018-03-01 NOTE — PROGRESS NOTE ADULT - SUBJECTIVE AND OBJECTIVE BOX
MRAQUIS JACOBS    [id number]    87y      Female    INTERVAL HPI/OVERNIGHT EVENTS: 86 y/o female admitted for clogged G tube, course complicated by hemorrhagic shock, MTP activated, had therapeutic endoscopy, gastric clips and cautery. Had been admitted to MICU and intubated, now extubated and hemodynamically stable on medical floor. Currently receiving feeds through PEG tube. Discussed case with RD on 2/28/2018, recommended bolus feeds of 200 ml Jevity 1.5 q3h. Received feeds last night and this morning. Trial bolus feeds well tolerated. Daughter wanting to be educated on use of PEG tube. Pt doing well. Examined laying down in bed, appears comfortable. No acute overnight events.    REVIEW OF SYSTEMS: Difficult to obtain d/t dementia.     Vital Signs Last 24 Hrs  T(C): 36.8 (01 Mar 2018 08:00), Max: 37.1 (01 Mar 2018 00:57)  T(F): 98.2 (01 Mar 2018 08:00), Max: 98.7 (01 Mar 2018 00:57)  HR: 88 (01 Mar 2018 08:00) (88 - 94)  BP: 117/67 (01 Mar 2018 08:00) (114/64 - 120/58)  BP(mean): --  RR: 18 (01 Mar 2018 08:00) (18 - 18)  SpO2: 97% (01 Mar 2018 08:00) (96% - 98%)    PHYSICAL EXAM:    GENERAL: NAD, well-groomed  HEENT: PERRL, +EOMI  NECK: soft, Supple, No JVD  CHEST/LUNG: Clear to ascultation bilaterally; No wheezing  HEART: S1S2+, Regular rate and rhythm; No murmurs, rubs, or gallops  ABDOMEN: Soft, Nontender, Nondistended  EXTREMITIES:  2+ Peripheral Pulses, No clubbing, cyanosis, or edema  SKIN: No rashes or lesions    LABS:                        9.1    7.6   )-----------( 174      ( 28 Feb 2018 07:54 )             27.7     02-28    138  |  104  |  16.0  ----------------------------<  147<H>  3.7   |  23.0  |  0.43<L>    Ca    9.2      28 Feb 2018 07:54    TPro  5.4<L>  /  Alb  2.9<L>  /  TBili  <0.2<L>  /  DBili  x   /  AST  9   /  ALT  9   /  AlkPhos  86  02-28      MEDICATIONS  (STANDING):  ciprofloxacin     Tablet 250 milliGRAM(s) Oral every 12 hours  lactobacillus acidophilus 1 Tablet(s) Oral every 12 hours  levothyroxine 112 MICROGram(s) Oral daily  pantoprazole   Suspension 40 milliGRAM(s) Oral two times a day before meals  sodium chloride 0.45%. 1000 milliLiter(s) (60 mL/Hr) IV Continuous <Continuous>

## 2018-03-01 NOTE — PROGRESS NOTE ADULT - PROBLEM SELECTOR PLAN 2
Stable without agitation.
Stable without agitation.
will discuss with daughter baseline
will discuss with daughter baseline

## 2018-03-01 NOTE — PROGRESS NOTE ADULT - PROBLEM SELECTOR PROBLEM 1
Gastrostomy hemorrhage

## 2018-03-01 NOTE — PROGRESS NOTE ADULT - PROBLEM SELECTOR PLAN 4
Due to volume contraction from GI bleed , dehydration.    Prerenal azotemia resolving on gentle hydration .   K + normalized. 3.7 today
Due to volume contraction from GI bleed , dehydration.    Prerenal azotemia resolving on gentle hydration.   K + normalized: 3.7 on 2/28/2018.
resolved due to GI bleed
resolved due to GI bleed

## 2018-03-01 NOTE — DISCHARGE NOTE ADULT - PATIENT PORTAL LINK FT
You can access the [facility], offered by [facility], by registering with the following website: [email or web address]

## 2018-03-01 NOTE — PROGRESS NOTE ADULT - PROBLEM SELECTOR PLAN 7
DVT: VCDs. GI: Protonix 40 mg IVP q12h    Supportive care. PT. CM working with daughter, as pt has 24/7 help, daughter not happy with aides. [facility] CM In touch w Medicaid [employer] CM to work on the situation. Information received 2/27 that Ensure was being poured directly into PEG tube. ?? reliability history. D/C pending 24-7 aide placement.   Discussed with CM, pt has supplies at home for 2 Juan Miguel HN bolus feeds, 4 cans/day following [employer] D/C. CM obtained information from daughter on course of feeds at home. As per RD, if pt tolerating bolus feeds of 200 ml Jevity 1.5 q3h well, can be D/Yusef home on the following regimen: 150 ml of 2 Juan Miguel HN q3h. Daughter wanting to be educated on use of PEG tube. CM aware of this plan.

## 2018-03-01 NOTE — DISCHARGE NOTE ADULT - VISION (WITH CORRECTIVE LENSES IF THE PATIENT USUALLY WEARS THEM):
Partially impaired: cannot see medication labels or newsprint, but can see obstacles in path, and the surrounding layout; can count fingers at arm's length/hx cataracts

## 2018-03-01 NOTE — PROGRESS NOTE ADULT - PROBLEM SELECTOR PLAN 3
Extubated . No further acute respiratory distress.
Extubated . No further acute respiratory distress.
extubated  weaned off 02 this am, tolerating well
SBT with possible extubation

## 2018-03-01 NOTE — DISCHARGE NOTE ADULT - PLAN OF CARE
return function of feeding tube s/p therapeutic endoscopy, gastric clips and cautery, PEG replaced- pt tolerating bolus feeds resolution of infection complete course of cipro

## 2018-03-01 NOTE — PROGRESS NOTE ADULT - PROBLEM SELECTOR PROBLEM 3
Acute respiratory failure
Acute respiratory failure, unspecified whether with hypoxia or hypercapnia
Acute respiratory failure, unspecified whether with hypoxia or hypercapnia
Acute respiratory failure

## 2019-06-22 ENCOUNTER — EMERGENCY (EMERGENCY)
Facility: HOSPITAL | Age: 84
LOS: 1 days | Discharge: HOME | End: 2019-06-22
Attending: EMERGENCY MEDICINE
Payer: MEDICARE

## 2019-06-22 VITALS
HEART RATE: 75 BPM | SYSTOLIC BLOOD PRESSURE: 160 MMHG | DIASTOLIC BLOOD PRESSURE: 83 MMHG | RESPIRATION RATE: 18 BRPM | WEIGHT: 164.91 LBS | OXYGEN SATURATION: 98 % | TEMPERATURE: 98 F

## 2019-06-22 DIAGNOSIS — Z98.890 OTHER SPECIFIED POSTPROCEDURAL STATES: Chronic | ICD-10-CM

## 2019-06-22 DIAGNOSIS — Z93.1 GASTROSTOMY STATUS: Chronic | ICD-10-CM

## 2019-06-22 PROBLEM — E03.9 HYPOTHYROIDISM, UNSPECIFIED: Chronic | Status: ACTIVE | Noted: 2018-02-22

## 2019-06-22 PROBLEM — F03.90 UNSPECIFIED DEMENTIA WITHOUT BEHAVIORAL DISTURBANCE: Chronic | Status: ACTIVE | Noted: 2018-02-22

## 2019-06-22 PROBLEM — E78.5 HYPERLIPIDEMIA, UNSPECIFIED: Chronic | Status: ACTIVE | Noted: 2018-02-22

## 2019-06-22 PROBLEM — S22.000A WEDGE COMPRESSION FRACTURE OF UNSPECIFIED THORACIC VERTEBRA, INITIAL ENCOUNTER FOR CLOSED FRACTURE: Chronic | Status: ACTIVE | Noted: 2018-02-22

## 2019-06-22 PROBLEM — I10 ESSENTIAL (PRIMARY) HYPERTENSION: Chronic | Status: ACTIVE | Noted: 2018-02-22

## 2019-06-22 LAB
ALBUMIN SERPL ELPH-MCNC: 4.3 G/DL — SIGNIFICANT CHANGE UP (ref 3.3–5.2)
ALP SERPL-CCNC: 139 U/L — HIGH (ref 40–120)
ALT FLD-CCNC: 19 U/L — SIGNIFICANT CHANGE UP
ANION GAP SERPL CALC-SCNC: 13 MMOL/L — SIGNIFICANT CHANGE UP (ref 5–17)
APPEARANCE UR: ABNORMAL
AST SERPL-CCNC: 21 U/L — SIGNIFICANT CHANGE UP
BACTERIA # UR AUTO: ABNORMAL
BASOPHILS # BLD AUTO: 0 K/UL — SIGNIFICANT CHANGE UP (ref 0–0.2)
BASOPHILS NFR BLD AUTO: 0.1 % — SIGNIFICANT CHANGE UP (ref 0–2)
BILIRUB SERPL-MCNC: 0.4 MG/DL — SIGNIFICANT CHANGE UP (ref 0.4–2)
BILIRUB UR-MCNC: NEGATIVE — SIGNIFICANT CHANGE UP
BUN SERPL-MCNC: 21 MG/DL — HIGH (ref 8–20)
CALCIUM SERPL-MCNC: 10.8 MG/DL — HIGH (ref 8.6–10.2)
CHLORIDE SERPL-SCNC: 101 MMOL/L — SIGNIFICANT CHANGE UP (ref 98–107)
CO2 SERPL-SCNC: 26 MMOL/L — SIGNIFICANT CHANGE UP (ref 22–29)
COLOR SPEC: YELLOW — SIGNIFICANT CHANGE UP
COMMENT - URINE: SIGNIFICANT CHANGE UP
CREAT SERPL-MCNC: 0.62 MG/DL — SIGNIFICANT CHANGE UP (ref 0.5–1.3)
DIFF PNL FLD: ABNORMAL
EOSINOPHIL # BLD AUTO: 0 K/UL — SIGNIFICANT CHANGE UP (ref 0–0.5)
EOSINOPHIL NFR BLD AUTO: 0 % — SIGNIFICANT CHANGE UP (ref 0–6)
EPI CELLS # UR: SIGNIFICANT CHANGE UP
GLUCOSE SERPL-MCNC: 114 MG/DL — SIGNIFICANT CHANGE UP (ref 70–115)
GLUCOSE UR QL: NEGATIVE MG/DL — SIGNIFICANT CHANGE UP
GRAN CASTS # UR COMP ASSIST: ABNORMAL /LPF
HCT VFR BLD CALC: 43.4 % — SIGNIFICANT CHANGE UP (ref 37–47)
HGB BLD-MCNC: 14.3 G/DL — SIGNIFICANT CHANGE UP (ref 12–16)
KETONES UR-MCNC: NEGATIVE — SIGNIFICANT CHANGE UP
LACTATE BLDV-MCNC: 1.4 MMOL/L — SIGNIFICANT CHANGE UP (ref 0.5–2)
LEUKOCYTE ESTERASE UR-ACNC: ABNORMAL
LYMPHOCYTES # BLD AUTO: 1.1 K/UL — SIGNIFICANT CHANGE UP (ref 1–4.8)
LYMPHOCYTES # BLD AUTO: 16.7 % — LOW (ref 20–55)
MCHC RBC-ENTMCNC: 28.9 PG — SIGNIFICANT CHANGE UP (ref 27–31)
MCHC RBC-ENTMCNC: 32.9 G/DL — SIGNIFICANT CHANGE UP (ref 32–36)
MCV RBC AUTO: 87.7 FL — SIGNIFICANT CHANGE UP (ref 81–99)
MONOCYTES # BLD AUTO: 0.3 K/UL — SIGNIFICANT CHANGE UP (ref 0–0.8)
MONOCYTES NFR BLD AUTO: 4.5 % — SIGNIFICANT CHANGE UP (ref 3–10)
NEUTROPHILS # BLD AUTO: 5.3 K/UL — SIGNIFICANT CHANGE UP (ref 1.8–8)
NEUTROPHILS NFR BLD AUTO: 78.7 % — HIGH (ref 37–73)
NITRITE UR-MCNC: NEGATIVE — SIGNIFICANT CHANGE UP
PH UR: 7 — SIGNIFICANT CHANGE UP (ref 5–8)
PLATELET # BLD AUTO: 173 K/UL — SIGNIFICANT CHANGE UP (ref 150–400)
POTASSIUM SERPL-MCNC: 5 MMOL/L — SIGNIFICANT CHANGE UP (ref 3.5–5.3)
POTASSIUM SERPL-SCNC: 5 MMOL/L — SIGNIFICANT CHANGE UP (ref 3.5–5.3)
PROT SERPL-MCNC: 7 G/DL — SIGNIFICANT CHANGE UP (ref 6.6–8.7)
PROT UR-MCNC: 30 MG/DL
RBC # BLD: 4.95 M/UL — SIGNIFICANT CHANGE UP (ref 4.4–5.2)
RBC # FLD: 14.7 % — SIGNIFICANT CHANGE UP (ref 11–15.6)
RBC CASTS # UR COMP ASSIST: ABNORMAL /HPF (ref 0–4)
SODIUM SERPL-SCNC: 140 MMOL/L — SIGNIFICANT CHANGE UP (ref 135–145)
SP GR SPEC: 1 — LOW (ref 1.01–1.02)
UROBILINOGEN FLD QL: NEGATIVE MG/DL — SIGNIFICANT CHANGE UP
WBC # BLD: 6.7 K/UL — SIGNIFICANT CHANGE UP (ref 4.8–10.8)
WBC # FLD AUTO: 6.7 K/UL — SIGNIFICANT CHANGE UP (ref 4.8–10.8)
WBC UR QL: >50

## 2019-06-22 PROCEDURE — 99284 EMERGENCY DEPT VISIT MOD MDM: CPT | Mod: 25

## 2019-06-22 PROCEDURE — 51701 INSERT BLADDER CATHETER: CPT

## 2019-06-22 PROCEDURE — 36415 COLL VENOUS BLD VENIPUNCTURE: CPT

## 2019-06-22 PROCEDURE — 85027 COMPLETE CBC AUTOMATED: CPT

## 2019-06-22 PROCEDURE — 87186 SC STD MICRODIL/AGAR DIL: CPT

## 2019-06-22 PROCEDURE — 87086 URINE CULTURE/COLONY COUNT: CPT

## 2019-06-22 PROCEDURE — 99284 EMERGENCY DEPT VISIT MOD MDM: CPT

## 2019-06-22 PROCEDURE — 71045 X-RAY EXAM CHEST 1 VIEW: CPT | Mod: 26

## 2019-06-22 PROCEDURE — 74177 CT ABD & PELVIS W/CONTRAST: CPT

## 2019-06-22 PROCEDURE — 81001 URINALYSIS AUTO W/SCOPE: CPT

## 2019-06-22 PROCEDURE — 74018 RADEX ABDOMEN 1 VIEW: CPT | Mod: 26

## 2019-06-22 PROCEDURE — 71260 CT THORAX DX C+: CPT

## 2019-06-22 PROCEDURE — 74018 RADEX ABDOMEN 1 VIEW: CPT

## 2019-06-22 PROCEDURE — 71045 X-RAY EXAM CHEST 1 VIEW: CPT

## 2019-06-22 PROCEDURE — 96374 THER/PROPH/DIAG INJ IV PUSH: CPT | Mod: XU

## 2019-06-22 PROCEDURE — 74177 CT ABD & PELVIS W/CONTRAST: CPT | Mod: 26

## 2019-06-22 PROCEDURE — 80053 COMPREHEN METABOLIC PANEL: CPT

## 2019-06-22 PROCEDURE — 71260 CT THORAX DX C+: CPT | Mod: 26

## 2019-06-22 PROCEDURE — 83605 ASSAY OF LACTIC ACID: CPT

## 2019-06-22 RX ORDER — CEPHALEXIN 500 MG
1 CAPSULE ORAL
Qty: 14 | Refills: 0
Start: 2019-06-22

## 2019-06-22 RX ORDER — CEFTRIAXONE 500 MG/1
1000 INJECTION, POWDER, FOR SOLUTION INTRAMUSCULAR; INTRAVENOUS ONCE
Refills: 0 | Status: COMPLETED | OUTPATIENT
Start: 2019-06-22 | End: 2019-06-22

## 2019-06-22 RX ADMIN — CEFTRIAXONE 100 MILLIGRAM(S): 500 INJECTION, POWDER, FOR SOLUTION INTRAMUSCULAR; INTRAVENOUS at 23:54

## 2019-06-22 NOTE — ED PROVIDER NOTE - CLINICAL SUMMARY MEDICAL DECISION MAKING FREE TEXT BOX
will fu ct chest given frequent aspiration pna in the past now with cough chills; check labs; ct abd to eval abd pathology; check peg placement ua--reassess

## 2019-06-22 NOTE — ED PROVIDER NOTE - PHYSICAL EXAMINATION
Head: atraumatic, normacephalic  Face: atraumatic,   eyes: perrla eomi  heart: rrr s1s2  lungs: decreased bs at bases  abd: soft, mild ttp to around PEG tube side nd +bs no rebound/guarding no cva ttp PEG dressing wet leaking contents; irritation to skin around PEG site  skin: warm  LE: no swelling, no calf ttp

## 2019-06-22 NOTE — ED PROVIDER NOTE - OBJECTIVE STATEMENT
89yo F hx of dementia, htn hypothyroid, PEG tube for frequent aspiration pna, frequent UTis pw leaking to PEG tube site. here with an aid. 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 pt notes mild chills +cough as per daughter and aid x few days. pt also points to her abd saying "pain". aid notes peg last exchanged 1 week ago at [employer]    family Denies f//n/v/cp/sob/palpitations/ rash//d/c/dysuria/hematuria. no sick contacts no recent travel

## 2019-06-22 NOTE — ED ADULT NURSE NOTE - PMH
Compression fracture of thoracic vertebra  T12  Dementia    HLD (hyperlipidemia)    HTN (hypertension)    Hypothyroidism

## 2019-06-22 NOTE — ED ADULT NURSE NOTE - NSIMPLEMENTINTERV_GEN_ALL_ED
Implemented All Fall Risk Interventions:  [place] to call system. Call bell, personal items and telephone within reach. Instruct patient to call for assistance. Room bathroom lighting operational. Non-slip footwear when patient is off stretcher. Physically safe environment: no spills, clutter or unnecessary equipment. Stretcher in lowest position, wheels locked, appropriate side rails in place. Provide visual cue, wrist band, yellow gown, etc. Monitor gait and stability. Monitor for mental status changes and reorient to person, place, and time. Review medications for side effects contributing to fall risk. Reinforce activity limits and safety measures with patient and family.

## 2019-06-22 NOTE — ED PROVIDER NOTE - PROGRESS NOTE DETAILS
Joy: pt ki dout to me pending ct results and gtube adjustment. gtube balloon deflated, readjusted and reinflated, flushed multiple times no leakage, xry confiming good opsition. spoke to vrholden Gruber, because report didn't come over, read impression over phone, pt with chronic pancreatitis, incidental findings, and ballon in gastric antrum (before readjustment). no other acute findings. pt to go home, daughter speedy aware, uti being traeted.

## 2019-06-23 VITALS
OXYGEN SATURATION: 95 % | TEMPERATURE: 98 F | HEART RATE: 81 BPM | DIASTOLIC BLOOD PRESSURE: 66 MMHG | SYSTOLIC BLOOD PRESSURE: 145 MMHG | RESPIRATION RATE: 19 BRPM

## 2019-06-23 NOTE — ED ADULT NURSE REASSESSMENT NOTE - NS ED NURSE REASSESS COMMENT FT1
Pt. relaxing comfortably, at baseline neuro status, in no apparent distress. Pt. discharged, awaiting EMS transport at this time.

## 2019-07-01 ENCOUNTER — OUTPATIENT (OUTPATIENT)
Dept: OUTPATIENT SERVICES | Facility: HOSPITAL | Age: 84
LOS: 1 days | End: 2019-07-01
Payer: MEDICARE

## 2019-07-01 DIAGNOSIS — Z93.1 GASTROSTOMY STATUS: Chronic | ICD-10-CM

## 2019-07-01 DIAGNOSIS — Z98.890 OTHER SPECIFIED POSTPROCEDURAL STATES: Chronic | ICD-10-CM

## 2019-07-08 DIAGNOSIS — Z71.89 OTHER SPECIFIED COUNSELING: ICD-10-CM

## 2019-08-05 DIAGNOSIS — Z76.89 PERSONS ENCOUNTERING HEALTH SERVICES IN OTHER SPECIFIED CIRCUMSTANCES: ICD-10-CM

## 2019-09-01 PROCEDURE — G9005: CPT

## 2020-02-22 NOTE — PHYSICAL THERAPY INITIAL EVALUATION ADULT - PATELLAR REFLEX
2/21/2020  IMorelia DO, saw and evaluated the patient  I have discussed the patient with the resident/non-physician practitioner and agree with the resident's/non-physician practitioner's findings, Plan of Care, and MDM as documented in the resident's/non-physician practitioner's note, except where noted  All available labs and Radiology studies were reviewed  I was present for key portions of any procedure(s) performed by the resident/non-physician practitioner and I was immediately available to provide assistance  At this point I agree with the current assessment done in the Emergency Department  I have conducted an independent evaluation of this patient a history and physical is as follows:    72-year-old female presents neck pain and headache  Patient states symptoms started yesterday mostly on the right side  Pain worse with movement and having difficulty turning her head secondary to the pain  Denies trauma, no heavy lifting or change in activity but has been under increased stress  Denies fevers  Also complains of headache  Patient had similar headaches in the past   No focal neurologic complaints  On exam-no acute distress, heart regular, no respiratory distress, muscle spasm in tenderness noted bilateral cervical paraspinal muscles and trapezius, worse on the right  Cranial nerves 2-12 intact without focal deficits, muscle strength 5/5 bilateral upper extremities with sensation intact  Plan-patient hypertensive will do CT a head and neck but suspect musculoskeletal pathology    Will give muscle relaxer, anti-inflammatory, Lidoderm patch    ED Course         Critical Care Time  Procedures
Bilateral:/(0) no response

## 2020-07-23 NOTE — DISCHARGE NOTE ADULT - MEDICATION SUMMARY - MEDICATIONS TO STOP TAKING
The following changes were made to your medications today:   STOP Hydrochlorothiazide   DO not take Valsartan 80 mg  Increase Metoprolol to 50 mg daily  START Eliquis 5 mg, 1 tablet TWICE daily  Continue all present medications    The following lifestyle modifications are encouraged:  Continue regular exercise at least 30 minutes daily.  Lowfat/Low Cholesterol diet advised.     The following tests have been ordered:  Stress test   ELOISA Cardioversion on August 6th   Labs today    Return to Clinic in 6 weeks or sooner if needed.            I will STOP taking the medications listed below when I get home from the hospital:    apixaban 2.5 mg oral tablet  -- 1 tab(s) by mouth 2 times a day

## 2021-07-21 NOTE — H&P ADULT - PMH
Compression fracture of thoracic vertebra  T12  Dementia    HLD (hyperlipidemia)    HTN (hypertension)    Hypothyroidism Depressed

## 2022-03-18 NOTE — PROGRESS NOTE ADULT - MUSCULOSKELETAL
----- Message from Letty Tijerina LPN sent at 3/7/2022  5:03 PM CST -----  Regarding: Labs  Please will have labs done before appt 8/18 please add labs needed have A1c          thanks    
Orders placed  
negative
no

## 2022-07-28 NOTE — ED ADULT NURSE NOTE - FALLEN IN THE PAST
No new orders noted. Escalated event to Quality and senior leadership. Calls were made to pt’s cellphone and home. Patient answered and found to be home. EMS was called to  patient and bought back to [facility] ED.” OK to given Olanzapine IM Precedex and Cardene Stopped.  500cc Bolus ordered, will continue to monitor and intervene as clinically indicated Zyprexa 5mg PO; zyprexa 5mg IM. continue to monitor. deescalate 500cc NS 0.9% bolus given as per order. Will continue to monitor patient. Hydralazine 10mg IV push Olanzapine IM. Patient supervised at this time. Lelo CAMEJO. KERMIT Humphries at bedside assessing patient. no

## 2024-02-23 NOTE — AIRWAY PLACEMENT NOTE ADULT - POST AIRWAY PLACEMENT ASSESSMENT:
CXR pending/breath sounds bilateral/breath sounds equal/positive end tidal CO2 noted Miriam Ruiz(Attending)

## 2024-03-08 NOTE — H&P ADULT - NSHPOUTPATIENTPROVIDERS_GEN_ALL_CORE
PMD: Unknown Please take your medications as prescribed.    Please take your medications as prescribed.   Please follow up with the Neurology team and Infectious disease team.

## 2025-01-12 NOTE — ED ADULT NURSE NOTE - NSFALLRSKUNASSIST_ED_ALL_ED
01-12    139  |  110[H]  |  36[H]  ----------------------------<  140[H]  3.8   |  17[L]  |  1.96[H]    Ca    9.1      12 Jan 2025 00:45  Phos  2.7     01-12  Mg     1.8     01-12    TPro  6.4  /  Alb  2.7[L]  /  TBili  0.6  /  DBili  x   /  AST  44[H]  /  ALT  67[H]  /  AlkPhos  341[H]  01-12  
no